# Patient Record
Sex: MALE | Race: WHITE | NOT HISPANIC OR LATINO
[De-identification: names, ages, dates, MRNs, and addresses within clinical notes are randomized per-mention and may not be internally consistent; named-entity substitution may affect disease eponyms.]

---

## 2017-01-27 ENCOUNTER — APPOINTMENT (OUTPATIENT)
Dept: SURGERY | Facility: CLINIC | Age: 82
End: 2017-01-27

## 2017-01-27 VITALS
OXYGEN SATURATION: 96 % | RESPIRATION RATE: 16 BRPM | HEART RATE: 71 BPM | TEMPERATURE: 98.6 F | SYSTOLIC BLOOD PRESSURE: 161 MMHG | DIASTOLIC BLOOD PRESSURE: 68 MMHG

## 2017-01-27 VITALS — HEIGHT: 70.5 IN | WEIGHT: 180 LBS | BODY MASS INDEX: 25.48 KG/M2

## 2017-01-27 DIAGNOSIS — Z80.3 FAMILY HISTORY OF MALIGNANT NEOPLASM OF BREAST: ICD-10-CM

## 2017-01-27 DIAGNOSIS — K62.89 OTHER SPECIFIED DISEASES OF ANUS AND RECTUM: ICD-10-CM

## 2017-01-27 DIAGNOSIS — Z86.79 PERSONAL HISTORY OF OTHER DISEASES OF THE CIRCULATORY SYSTEM: ICD-10-CM

## 2017-01-27 DIAGNOSIS — Z86.73 PERSONAL HISTORY OF TRANSIENT ISCHEMIC ATTACK (TIA), AND CEREBRAL INFARCTION W/OUT RESIDUAL DEFICITS: ICD-10-CM

## 2017-01-27 DIAGNOSIS — Z78.9 OTHER SPECIFIED HEALTH STATUS: ICD-10-CM

## 2017-01-27 DIAGNOSIS — K59.00 CONSTIPATION, UNSPECIFIED: ICD-10-CM

## 2017-01-27 DIAGNOSIS — Z86.2 PERSONAL HISTORY OF DISEASES OF THE BLOOD AND BLOOD-FORMING ORGANS AND CERTAIN DISORDERS INVOLVING THE IMMUNE MECHANISM: ICD-10-CM

## 2017-01-27 RX ORDER — LORATADINE 5 MG
TABLET,CHEWABLE ORAL
Refills: 0 | Status: ACTIVE | COMMUNITY

## 2017-01-27 RX ORDER — SIMVASTATIN 20 MG/1
20 TABLET, FILM COATED ORAL
Refills: 0 | Status: ACTIVE | COMMUNITY

## 2017-01-27 RX ORDER — ASPIRIN 325 MG/1
325 TABLET, FILM COATED ORAL
Refills: 0 | Status: ACTIVE | COMMUNITY

## 2017-02-03 ENCOUNTER — CHART COPY (OUTPATIENT)
Age: 82
End: 2017-02-03

## 2017-02-03 ENCOUNTER — APPOINTMENT (OUTPATIENT)
Dept: SURGERY | Facility: CLINIC | Age: 82
End: 2017-02-03

## 2017-02-03 VITALS
SYSTOLIC BLOOD PRESSURE: 150 MMHG | HEART RATE: 64 BPM | DIASTOLIC BLOOD PRESSURE: 76 MMHG | RESPIRATION RATE: 16 BRPM | TEMPERATURE: 98 F | OXYGEN SATURATION: 97 %

## 2017-03-21 ENCOUNTER — APPOINTMENT (OUTPATIENT)
Dept: SURGERY | Facility: CLINIC | Age: 82
End: 2017-03-21

## 2017-03-21 VITALS
OXYGEN SATURATION: 98 % | TEMPERATURE: 98.4 F | RESPIRATION RATE: 16 BRPM | HEART RATE: 67 BPM | SYSTOLIC BLOOD PRESSURE: 145 MMHG | DIASTOLIC BLOOD PRESSURE: 67 MMHG

## 2017-03-21 DIAGNOSIS — K64.8 OTHER HEMORRHOIDS: ICD-10-CM

## 2018-07-23 PROBLEM — Z86.73 HISTORY OF STROKE: Status: RESOLVED | Noted: 2017-01-27 | Resolved: 2018-07-23

## 2021-01-20 ENCOUNTER — INPATIENT (INPATIENT)
Facility: HOSPITAL | Age: 86
LOS: 4 days | Discharge: HOME | DRG: 641 | End: 2021-01-25
Attending: INTERNAL MEDICINE | Admitting: INTERNAL MEDICINE
Payer: MEDICARE

## 2021-01-20 VITALS
OXYGEN SATURATION: 97 % | WEIGHT: 136.03 LBS | HEART RATE: 66 BPM | HEIGHT: 70 IN | SYSTOLIC BLOOD PRESSURE: 158 MMHG | DIASTOLIC BLOOD PRESSURE: 57 MMHG | RESPIRATION RATE: 16 BRPM | TEMPERATURE: 97 F

## 2021-01-20 DIAGNOSIS — C26.9 MALIGNANT NEOPLASM OF ILL-DEFINED SITES WITHIN THE DIGESTIVE SYSTEM: ICD-10-CM

## 2021-01-20 DIAGNOSIS — E83.52 HYPERCALCEMIA: ICD-10-CM

## 2021-01-20 DIAGNOSIS — R26.2 DIFFICULTY IN WALKING, NOT ELSEWHERE CLASSIFIED: ICD-10-CM
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
## 2021-01-20 DIAGNOSIS — N17.9 ACUTE KIDNEY FAILURE, UNSPECIFIED: ICD-10-CM

## 2021-01-20 LAB
ALBUMIN SERPL ELPH-MCNC: 3.1 G/DL — LOW (ref 3.3–5)
ALP SERPL-CCNC: 85 U/L — SIGNIFICANT CHANGE UP (ref 30–120)
ALT FLD-CCNC: 21 U/L DA — SIGNIFICANT CHANGE UP (ref 10–60)
ANION GAP SERPL CALC-SCNC: 4 MMOL/L — LOW (ref 5–17)
APPEARANCE UR: CLEAR — SIGNIFICANT CHANGE UP
APTT BLD: 28.5 SEC — SIGNIFICANT CHANGE UP (ref 27.5–35.5)
AST SERPL-CCNC: 24 U/L — SIGNIFICANT CHANGE UP (ref 10–40)
BACTERIA # UR AUTO: ABNORMAL
BASOPHILS # BLD AUTO: 0.08 K/UL — SIGNIFICANT CHANGE UP (ref 0–0.2)
BASOPHILS NFR BLD AUTO: 0.5 % — SIGNIFICANT CHANGE UP (ref 0–2)
BILIRUB SERPL-MCNC: 0.4 MG/DL — SIGNIFICANT CHANGE UP (ref 0.2–1.2)
BILIRUB UR-MCNC: NEGATIVE — SIGNIFICANT CHANGE UP
BUN SERPL-MCNC: 36 MG/DL — HIGH (ref 7–23)
CALCIUM SERPL-MCNC: 13 MG/DL — CRITICAL HIGH (ref 8.4–10.5)
CHLORIDE SERPL-SCNC: 105 MMOL/L — SIGNIFICANT CHANGE UP (ref 96–108)
CO2 SERPL-SCNC: 29 MMOL/L — SIGNIFICANT CHANGE UP (ref 22–31)
COLOR SPEC: YELLOW — SIGNIFICANT CHANGE UP
CREAT SERPL-MCNC: 1.78 MG/DL — HIGH (ref 0.5–1.3)
DIFF PNL FLD: ABNORMAL
EOSINOPHIL # BLD AUTO: 0.55 K/UL — HIGH (ref 0–0.5)
EOSINOPHIL NFR BLD AUTO: 3.7 % — SIGNIFICANT CHANGE UP (ref 0–6)
EPI CELLS # UR: SIGNIFICANT CHANGE UP
GLUCOSE BLDC GLUCOMTR-MCNC: 87 MG/DL — SIGNIFICANT CHANGE UP (ref 70–99)
GLUCOSE SERPL-MCNC: 110 MG/DL — HIGH (ref 70–99)
GLUCOSE UR QL: NEGATIVE MG/DL — SIGNIFICANT CHANGE UP
HCT VFR BLD CALC: 35.6 % — LOW (ref 39–50)
HGB BLD-MCNC: 11.5 G/DL — LOW (ref 13–17)
IMM GRANULOCYTES NFR BLD AUTO: 0.5 % — SIGNIFICANT CHANGE UP (ref 0–1.5)
INR BLD: 1.05 RATIO — SIGNIFICANT CHANGE UP (ref 0.88–1.16)
KETONES UR-MCNC: NEGATIVE — SIGNIFICANT CHANGE UP
LEUKOCYTE ESTERASE UR-ACNC: ABNORMAL
LYMPHOCYTES # BLD AUTO: 23.7 % — SIGNIFICANT CHANGE UP (ref 13–44)
LYMPHOCYTES # BLD AUTO: 3.52 K/UL — HIGH (ref 1–3.3)
MANUAL SMEAR VERIFICATION: SIGNIFICANT CHANGE UP
MCHC RBC-ENTMCNC: 29.4 PG — SIGNIFICANT CHANGE UP (ref 27–34)
MCHC RBC-ENTMCNC: 32.3 GM/DL — SIGNIFICANT CHANGE UP (ref 32–36)
MCV RBC AUTO: 91 FL — SIGNIFICANT CHANGE UP (ref 80–100)
MONOCYTES # BLD AUTO: 1.31 K/UL — HIGH (ref 0–0.9)
MONOCYTES NFR BLD AUTO: 8.8 % — SIGNIFICANT CHANGE UP (ref 2–14)
NEUTROPHILS # BLD AUTO: 9.33 K/UL — HIGH (ref 1.8–7.4)
NEUTROPHILS NFR BLD AUTO: 62.8 % — SIGNIFICANT CHANGE UP (ref 43–77)
NITRITE UR-MCNC: NEGATIVE — SIGNIFICANT CHANGE UP
NRBC # BLD: 0 /100 WBCS — SIGNIFICANT CHANGE UP (ref 0–0)
PH UR: 5 — SIGNIFICANT CHANGE UP (ref 5–8)
PLAT MORPH BLD: NORMAL — SIGNIFICANT CHANGE UP
PLATELET # BLD AUTO: 40 K/UL — LOW (ref 150–400)
POTASSIUM SERPL-MCNC: 3.8 MMOL/L — SIGNIFICANT CHANGE UP (ref 3.5–5.3)
POTASSIUM SERPL-SCNC: 3.8 MMOL/L — SIGNIFICANT CHANGE UP (ref 3.5–5.3)
PROT SERPL-MCNC: 7.6 G/DL — SIGNIFICANT CHANGE UP (ref 6–8.3)
PROT UR-MCNC: 30 MG/DL
PROTHROM AB SERPL-ACNC: 12.7 SEC — SIGNIFICANT CHANGE UP (ref 10.6–13.6)
RBC # BLD: 3.91 M/UL — LOW (ref 4.2–5.8)
RBC # FLD: 13.8 % — SIGNIFICANT CHANGE UP (ref 10.3–14.5)
RBC BLD AUTO: SIGNIFICANT CHANGE UP
RBC CASTS # UR COMP ASSIST: ABNORMAL /HPF (ref 0–4)
SARS-COV-2 RNA SPEC QL NAA+PROBE: SIGNIFICANT CHANGE UP
SODIUM SERPL-SCNC: 138 MMOL/L — SIGNIFICANT CHANGE UP (ref 135–145)
SP GR SPEC: 1.02 — SIGNIFICANT CHANGE UP (ref 1.01–1.02)
TROPONIN I SERPL-MCNC: 0.01 NG/ML — LOW (ref 0.02–0.06)
UROBILINOGEN FLD QL: NEGATIVE MG/DL — SIGNIFICANT CHANGE UP
WBC # BLD: 14.86 K/UL — HIGH (ref 3.8–10.5)
WBC # FLD AUTO: 14.86 K/UL — HIGH (ref 3.8–10.5)
WBC UR QL: SIGNIFICANT CHANGE UP

## 2021-01-20 PROCEDURE — 99285 EMERGENCY DEPT VISIT HI MDM: CPT

## 2021-01-20 PROCEDURE — 72125 CT NECK SPINE W/O DYE: CPT | Mod: 26,MH

## 2021-01-20 PROCEDURE — 73080 X-RAY EXAM OF ELBOW: CPT | Mod: 26,LT

## 2021-01-20 PROCEDURE — 71045 X-RAY EXAM CHEST 1 VIEW: CPT | Mod: 26

## 2021-01-20 PROCEDURE — 72110 X-RAY EXAM L-2 SPINE 4/>VWS: CPT | Mod: 26

## 2021-01-20 PROCEDURE — 70450 CT HEAD/BRAIN W/O DYE: CPT | Mod: 26,MH

## 2021-01-20 RX ORDER — SODIUM CHLORIDE 9 MG/ML
1000 INJECTION INTRAMUSCULAR; INTRAVENOUS; SUBCUTANEOUS
Refills: 0 | Status: DISCONTINUED | OUTPATIENT
Start: 2021-01-20 | End: 2021-01-24

## 2021-01-20 RX ORDER — TAMSULOSIN HYDROCHLORIDE 0.4 MG/1
0.4 CAPSULE ORAL AT BEDTIME
Refills: 0 | Status: DISCONTINUED | OUTPATIENT
Start: 2021-01-20 | End: 2021-01-25

## 2021-01-20 RX ORDER — SODIUM CHLORIDE 9 MG/ML
500 INJECTION INTRAMUSCULAR; INTRAVENOUS; SUBCUTANEOUS ONCE
Refills: 0 | Status: COMPLETED | OUTPATIENT
Start: 2021-01-20 | End: 2021-01-20

## 2021-01-20 RX ORDER — AMLODIPINE BESYLATE 2.5 MG/1
5 TABLET ORAL DAILY
Refills: 0 | Status: DISCONTINUED | OUTPATIENT
Start: 2021-01-20 | End: 2021-01-21

## 2021-01-20 RX ADMIN — SODIUM CHLORIDE 100 MILLILITER(S): 9 INJECTION INTRAMUSCULAR; INTRAVENOUS; SUBCUTANEOUS at 16:52

## 2021-01-20 RX ADMIN — AMLODIPINE BESYLATE 5 MILLIGRAM(S): 2.5 TABLET ORAL at 16:52

## 2021-01-20 RX ADMIN — SODIUM CHLORIDE 500 MILLILITER(S): 9 INJECTION INTRAMUSCULAR; INTRAVENOUS; SUBCUTANEOUS at 14:25

## 2021-01-20 RX ADMIN — SODIUM CHLORIDE 500 MILLILITER(S): 9 INJECTION INTRAMUSCULAR; INTRAVENOUS; SUBCUTANEOUS at 15:20

## 2021-01-20 RX ADMIN — TAMSULOSIN HYDROCHLORIDE 0.4 MILLIGRAM(S): 0.4 CAPSULE ORAL at 20:42

## 2021-01-20 NOTE — CONSULT NOTE ADULT - ASSESSMENT
JOSS: Prerenal azotemia  Hypercalcemia: ? Of malignancy  Hypertension  s/p Fall    Will try to get out pt med list from pt's wife. Continue IV hydration with saline. Check Vit D, Phos, iPTH.   Add amlodipine. Monitor BP trend. Titrate BP meds as needed. Salt restriction. Fall precautions.   Avoid nephrotoxic meds as possible. Will follow electrolytes and renal function trend.   Further recommendations pending clinical course. Thank you for the courtesy of this referral.

## 2021-01-20 NOTE — CONSULT NOTE ADULT - ASSESSMENT
The patient is a 89 year old male with a history of gastric cancer s/p chemotherapy who presents with fall.    Plan:  - Episode more consistent with a mechanical fall rather than syncope  - ECG with LBBB; no prior for comparison  - CT head negative for acute pathology  - Cardiac enzymes negative  - If patient is admitted, check echo given LBBB; otherwise this can be done as outpatient The patient is a 89 year old male with a history of gastric cancer s/p chemotherapy who presents with fall.    Plan:  - Episode more consistent with a mechanical fall rather than syncope  - ECG with LBBB; no prior for comparison  - CT head negative for acute pathology  - Cardiac enzymes negative  - JOSS and elevated Ca noted - continue IV fluids  - If patient is admitted, check echo given LBBB; otherwise this can be done as outpatient

## 2021-01-20 NOTE — ED PROVIDER NOTE - CARE PLAN
Principal Discharge DX:	Fall  Secondary Diagnosis:	Ambulatory dysfunction  Secondary Diagnosis:	JOSS (acute kidney injury)  Secondary Diagnosis:	Hypercalcemia

## 2021-01-20 NOTE — ED ADULT TRIAGE NOTE - CHIEF COMPLAINT QUOTE
" I slipped in the bathroom this morning and landed on my back. My whole back hurts and my left arm too. " Pt denies head injury No LOC

## 2021-01-20 NOTE — ED ADULT NURSE NOTE - NSIMPLEMENTINTERV_GEN_ALL_ED
Implemented All Fall with Harm Risk Interventions:  [place] to call system. Call bell, personal items and telephone within reach. Instruct patient to call for assistance. Room bathroom lighting operational. Non-slip footwear when patient is off stretcher. Physically safe environment: no spills, clutter or unnecessary equipment. Stretcher in lowest position, wheels locked, appropriate side rails in place. Provide visual cue, wrist band, yellow gown, etc. Monitor gait and stability. Monitor for mental status changes and reorient to person, place, and time. Review medications for side effects contributing to fall risk. Reinforce activity limits and safety measures with patient and family. Provide visual clues: red socks.

## 2021-01-20 NOTE — PHYSICAL THERAPY INITIAL EVALUATION ADULT - GAIT DEVIATIONS NOTED, PT EVAL
decreased lidia/increased time in double stance/decreased velocity of limb motion/decreased step length/decreased stride length/decreased swing-to-stance ratio/decreased weight-shifting ability

## 2021-01-20 NOTE — ED PROVIDER NOTE - ENMT, MLM
Airway patent, Nasal mucosa clear. Mouth with normal mucosa.dry lips Throat has no vesicles, no oropharyngeal exudates and uvula is midline.

## 2021-01-20 NOTE — H&P ADULT - PROBLEM SELECTOR PLAN 2
IVF  Avoid nephrotoxic medications  Monitor BMP  Renal consult  Further work-up/management pending clinical course.

## 2021-01-20 NOTE — ED PROVIDER NOTE - PROGRESS NOTE DETAILS
dr wilson cardio eval pt in ed. advised if pt admitted in pt echo if pt dc out pt echo. will re-eval labs and imaging reviewed. will admit to medicine dr grove will kindly accept pt.

## 2021-01-20 NOTE — ED PROVIDER NOTE - OBJECTIVE STATEMENT
pt is a 89yo male with pmhx of stomach cancer presents s/p fall last pm. pt reports he is unsure why he fell last pm and unsure if he had loc. pt reports neck pain and right elbow pain. pt did not take anything for symptoms. pt has been falling more frequently recently, uses a walker at baseline. pt denies fever, cp, sob, n/v/, ac use .   pmd: mi

## 2021-01-20 NOTE — ED PROVIDER NOTE - CLINICAL SUMMARY MEDICAL DECISION MAKING FREE TEXT BOX
pt is a 89yo male with pmhx of stomach cancer presents s/p fall last pm. pt reports he is unsure why he fell last pm and unsure if he had loc. pt reports neck pain and left elbow pain. pt did not take anything for symptoms. pt has been falling more frequently recently, uses a walker at baseline. will do labs, ua to r/o uti, ct head to r/o ich, ct c-spine to r/o fx, xrays, consult cardio, consult pt.

## 2021-01-20 NOTE — ED ADULT NURSE NOTE - OBJECTIVE STATEMENT
pt states he fell in the bathroom last night unsure if he hit his head or not. pt states hes been falling a lot this year, uses a walker at home lives at home with his wife. has abrasion to L elbow, and is c/o neck pain and back pain. pt has right chest wall port for GI cancer last treatment was 2 weeks ago. no other sins of trauma or deformity noted, no obvious head injury, abd soft nontender.

## 2021-01-20 NOTE — PHYSICAL THERAPY INITIAL EVALUATION ADULT - ADDITIONAL COMMENTS
Pt lives in private home with 2 MARCO A + HR and 1 flight in home to bedroom + HR. Pt owns a RW and SAC. Pt's wife (Zulay) states he has fallen twice in the past month.

## 2021-01-20 NOTE — PHYSICAL THERAPY INITIAL EVALUATION ADULT - PERTINENT HX OF CURRENT PROBLEM, REHAB EVAL
The patient is a 89 year old male with a history of gastric cancer s/p chemotherapy who presents with fall. He states he was in his bathroom where he stepped wrong and fell. He denies passing out or lightheadedness. No chest pain, shortness of breath, or palpitations. CT scan of head and C spine negative. The patient is a 89 year old male with a history of gastric cancer s/p chemotherapy who presents with fall. He states he was in his bathroom where he stepped wrong and fell. He denies passing out or lightheadedness. No chest pain, shortness of breath, or palpitations. CT scan of head and C spine negative. x-ray left elbow negative.

## 2021-01-20 NOTE — PHYSICAL THERAPY INITIAL EVALUATION ADULT - IMPAIRMENTS CONTRIBUTING TO GAIT DEVIATIONS, PT EVAL
impaired balance/impaired coordination/decreased flexibility/narrow base of support/decreased strength

## 2021-01-20 NOTE — ED PROVIDER NOTE - ATTENDING CONTRIBUTION TO CARE
89 yo M p/w 2 falls over past several days. Pt with some back pain from fall today - diff ambulating . No cp/sob/palp. no known fever/chills. no recent illness. no agg/allev factors. No other inj or co.  exam: MM MOist. neck supple. L elbow abrasion, No ext signs of head trauma. Mild paraspinal lumbar tend. no signs of truncal trauma. nl non-focal neuro exam. no other acute findings.  No known covid exposure / prior infxn  Check labs, xr, CT  Pt with elev Ca, renal insuff - IVF, TBA

## 2021-01-20 NOTE — ED PROVIDER NOTE - PHYSICAL EXAMINATION
SPINE: c-spine: supple, no spinal tenderness. no midline tenderness. no paraspinal tenderness. no body step off. no deformity. no muscle spasm. FROM   Thoracic spine: no spinal tenderness. no midline tenderness. no paraspinal tenderness. no body step off. no deformity. no muscle spasm.FROM   LS-spine:+spinal tenderness. +midline tenderness. no paraspinal tenderness. no body step off. no deformity.no muscle spasm. FROM from x 4. SENSATION GROSSLY INTACT X4.  sm r ue+ r shoulder nontender + r elbow minimal tenderness. rest of extremity nontender from sensation intact + 2 radial

## 2021-01-20 NOTE — H&P ADULT - HISTORY OF PRESENT ILLNESS
This is a 89 M with PMH of HLD BPH gastric cancer diagnosed 3/2020 who came in c/o multiple falls. Patient has been mostly bed ridden recently as per wife. Patient walks with cane. He got up to go to bathroom yesterday and he fell. Denies dizziness, lightheadedness, palpitations. Patient has had about 4 falls in the past couple of months. He had immunotherapy several months ago (3 rounds). He was to start chemotherapy this week. Wife states that his blood was "normal" about a month ago. + weight loss over the past several months. Denies CP, fevers, chills, abdominal, n/v/d.

## 2021-01-21 ENCOUNTER — TRANSCRIPTION ENCOUNTER (OUTPATIENT)
Age: 86
End: 2021-01-21

## 2021-01-21 DIAGNOSIS — D69.6 THROMBOCYTOPENIA, UNSPECIFIED: ICD-10-CM

## 2021-01-21 DIAGNOSIS — J90 PLEURAL EFFUSION, NOT ELSEWHERE CLASSIFIED: ICD-10-CM

## 2021-01-21 LAB
% ALBUMIN: 47.2 % — SIGNIFICANT CHANGE UP
% ALPHA 1: 4.1 % — SIGNIFICANT CHANGE UP
% ALPHA 2: 10.2 % — SIGNIFICANT CHANGE UP
% BETA: 9.8 % — SIGNIFICANT CHANGE UP
% GAMMA: 28.7 % — SIGNIFICANT CHANGE UP
% M SPIKE: 7.7 % — SIGNIFICANT CHANGE UP
24R-OH-CALCIDIOL SERPL-MCNC: 17.4 NG/ML — LOW (ref 30–80)
ALBUMIN SERPL ELPH-MCNC: 2.8 G/DL — LOW (ref 3.3–5)
ALBUMIN SERPL ELPH-MCNC: 3.2 G/DL — LOW (ref 3.6–5.5)
ALBUMIN/GLOB SERPL ELPH: 0.9 RATIO — SIGNIFICANT CHANGE UP
ALP SERPL-CCNC: 86 U/L — SIGNIFICANT CHANGE UP (ref 30–120)
ALPHA1 GLOB SERPL ELPH-MCNC: 0.3 G/DL — SIGNIFICANT CHANGE UP (ref 0.1–0.4)
ALPHA2 GLOB SERPL ELPH-MCNC: 0.7 G/DL — SIGNIFICANT CHANGE UP (ref 0.5–1)
ALT FLD-CCNC: 16 U/L DA — SIGNIFICANT CHANGE UP (ref 10–60)
ANION GAP SERPL CALC-SCNC: 9 MMOL/L — SIGNIFICANT CHANGE UP (ref 5–17)
AST SERPL-CCNC: 27 U/L — SIGNIFICANT CHANGE UP (ref 10–40)
B-GLOBULIN SERPL ELPH-MCNC: 0.7 G/DL — SIGNIFICANT CHANGE UP (ref 0.5–1)
BILIRUB SERPL-MCNC: 0.5 MG/DL — SIGNIFICANT CHANGE UP (ref 0.2–1.2)
BUN SERPL-MCNC: 34 MG/DL — HIGH (ref 7–23)
CALCIUM SERPL-MCNC: 12.2 MG/DL — HIGH (ref 8.4–10.5)
CHLORIDE SERPL-SCNC: 108 MMOL/L — SIGNIFICANT CHANGE UP (ref 96–108)
CO2 SERPL-SCNC: 25 MMOL/L — SIGNIFICANT CHANGE UP (ref 22–31)
CREAT SERPL-MCNC: 1.51 MG/DL — HIGH (ref 0.5–1.3)
CULTURE RESULTS: SIGNIFICANT CHANGE UP
GAMMA GLOBULIN: 2 G/DL — HIGH (ref 0.6–1.6)
GLUCOSE SERPL-MCNC: 80 MG/DL — SIGNIFICANT CHANGE UP (ref 70–99)
HCT VFR BLD CALC: 35.6 % — LOW (ref 39–50)
HGB BLD-MCNC: 11.6 G/DL — LOW (ref 13–17)
INTERPRETATION SERPL IFE-IMP: SIGNIFICANT CHANGE UP
LDH SERPL L TO P-CCNC: 484 U/L — HIGH (ref 50–242)
M-SPIKE: 0.5 G/DL — HIGH (ref 0–0)
MANUAL SMEAR VERIFICATION: SIGNIFICANT CHANGE UP
MCHC RBC-ENTMCNC: 29.5 PG — SIGNIFICANT CHANGE UP (ref 27–34)
MCHC RBC-ENTMCNC: 32.6 GM/DL — SIGNIFICANT CHANGE UP (ref 32–36)
MCV RBC AUTO: 90.6 FL — SIGNIFICANT CHANGE UP (ref 80–100)
NRBC # BLD: 0 /100 WBCS — SIGNIFICANT CHANGE UP (ref 0–0)
PHOSPHATE SERPL-MCNC: 3.1 MG/DL — SIGNIFICANT CHANGE UP (ref 2.5–4.5)
PLAT MORPH BLD: ABNORMAL
PLATELET # BLD AUTO: 32 K/UL — LOW (ref 150–400)
POTASSIUM SERPL-MCNC: 3.8 MMOL/L — SIGNIFICANT CHANGE UP (ref 3.5–5.3)
POTASSIUM SERPL-SCNC: 3.8 MMOL/L — SIGNIFICANT CHANGE UP (ref 3.5–5.3)
PROT PATTERN SERPL ELPH-IMP: SIGNIFICANT CHANGE UP
PROT SERPL-MCNC: 6.8 G/DL — SIGNIFICANT CHANGE UP (ref 6–8.3)
PROT SERPL-MCNC: 6.8 G/DL — SIGNIFICANT CHANGE UP (ref 6–8.3)
PTH-INTACT FLD-MCNC: 6 PG/ML — LOW (ref 15–65)
RBC # BLD: 3.93 M/UL — LOW (ref 4.2–5.8)
RBC # FLD: 13.6 % — SIGNIFICANT CHANGE UP (ref 10.3–14.5)
RBC BLD AUTO: ABNORMAL
SARS-COV-2 IGG SERPL QL IA: NEGATIVE — SIGNIFICANT CHANGE UP
SARS-COV-2 IGM SERPL IA-ACNC: <3.8 AU/ML — SIGNIFICANT CHANGE UP
SODIUM SERPL-SCNC: 142 MMOL/L — SIGNIFICANT CHANGE UP (ref 135–145)
SPECIMEN SOURCE: SIGNIFICANT CHANGE UP
URATE SERPL-MCNC: 9.3 MG/DL — HIGH (ref 3.4–8.8)
VIT D25+D1,25 OH+D1,25 PNL SERPL-MCNC: 51 PG/ML — SIGNIFICANT CHANGE UP (ref 19.9–79.3)
WBC # BLD: 14.07 K/UL — HIGH (ref 3.8–10.5)
WBC # FLD AUTO: 14.07 K/UL — HIGH (ref 3.8–10.5)

## 2021-01-21 PROCEDURE — 93306 TTE W/DOPPLER COMPLETE: CPT | Mod: 26

## 2021-01-21 PROCEDURE — 71250 CT THORAX DX C-: CPT | Mod: 26

## 2021-01-21 PROCEDURE — 74176 CT ABD & PELVIS W/O CONTRAST: CPT | Mod: 26

## 2021-01-21 RX ORDER — IOHEXOL 300 MG/ML
30 INJECTION, SOLUTION INTRAVENOUS ONCE
Refills: 0 | Status: COMPLETED | OUTPATIENT
Start: 2021-01-21 | End: 2021-01-21

## 2021-01-21 RX ORDER — PAMIDRONATE DISODIUM 9 MG/ML
60 INJECTION, SOLUTION INTRAVENOUS ONCE
Refills: 0 | Status: COMPLETED | OUTPATIENT
Start: 2021-01-21 | End: 2021-01-21

## 2021-01-21 RX ORDER — FUROSEMIDE 40 MG
20 TABLET ORAL ONCE
Refills: 0 | Status: COMPLETED | OUTPATIENT
Start: 2021-01-21 | End: 2021-01-21

## 2021-01-21 RX ADMIN — Medication 20 MILLIGRAM(S): at 13:47

## 2021-01-21 RX ADMIN — PAMIDRONATE DISODIUM 65 MILLIGRAM(S): 9 INJECTION, SOLUTION INTRAVENOUS at 12:41

## 2021-01-21 RX ADMIN — TAMSULOSIN HYDROCHLORIDE 0.4 MILLIGRAM(S): 0.4 CAPSULE ORAL at 21:35

## 2021-01-21 RX ADMIN — AMLODIPINE BESYLATE 5 MILLIGRAM(S): 2.5 TABLET ORAL at 09:33

## 2021-01-21 RX ADMIN — IOHEXOL 30 MILLILITER(S): 300 INJECTION, SOLUTION INTRAVENOUS at 08:32

## 2021-01-21 NOTE — DISCHARGE NOTE PROVIDER - CARE PROVIDER_API CALL
Travis Clark J  NEUROLOGY  [address] [place] [address]  Phone: [phone]  Fax: [phone]  Follow Up Time:    [facility],   Phone: [phone]  Fax: (   )    -  Follow Up Time:

## 2021-01-21 NOTE — DISCHARGE NOTE PROVIDER - PROVIDER TOKENS
PROVIDER:[TOKEN:[[id number]]] FREE:[LAST:[[facility]],PHONE:[phone]],FAX:[(   )    -]]

## 2021-01-21 NOTE — DISCHARGE NOTE PROVIDER - NSDCMRMEDTOKEN_GEN_ALL_CORE_FT
Flomax 0.4 mg oral capsule: 1 cap(s) orally once a day (at bedtime)   amLODIPine 2.5 mg oral tablet: 1 tab(s) orally once a day

## 2021-01-21 NOTE — CONSULT NOTE ADULT - PROBLEM SELECTOR RECOMMENDATION 9
89 M with PMH of HLD BPH gastric cancer diagnosed 3/2020 who came in c/o multiple falls. Patient has been mostly bed ridden recently as per wife. Patient walks with cane  mets malignancy  pleural eff - suspicion for malignant pleural eff -   ct scan reviewed  DNR DNI as per WIFE  supportive medical regimen  plan for Palliative Thoracentesis - left eff - with IR -   monitor for needs, assist with ADL - fall prec - nutrition - assess for pain, distress  on Flomax for BPH  poss Home Hospice dispo -

## 2021-01-21 NOTE — PROGRESS NOTE ADULT - ASSESSMENT
The patient is a 89 year old male with a history of gastric cancer s/p chemotherapy who presents with fall.    Plan:  - Episode more consistent with a mechanical fall rather than syncope  - ECG with LBBB; no prior for comparison  - CT head negative for acute pathology  - Cardiac enzymes negative  - JOSS and elevated Ca noted - continue IV fluids  - Check echo  - Neurology eval noted

## 2021-01-21 NOTE — DISCHARGE NOTE PROVIDER - HOSPITAL COURSE
This is a 89 M with PMH of HLD BPH gastric cancer diagnosed 3/2020 who came in c/o multiple falls. Patient has been mostly bed ridden recently as per wife. Patient walks with cane. He got up to go to bathroom yesterday and he fell. Denies dizziness, lightheadedness, palpitations. Patient has had about 4 falls in the past couple of months. He had immunotherapy several months ago (3 rounds). He was to start chemotherapy this week. Wife states that his blood was "normal" about a month ago. + weight loss over the past several months. Denies CP, fevers, chills, abdominal, n/v/d.    Patient admitted with hypercalcemia due to malignancy  Treated with iv palmidronate and calcitonin with improvement  CT showed advanced metastatic disease  Family opted for home hospice    >35 minutes spent on discharge

## 2021-01-21 NOTE — PROGRESS NOTE ADULT - ASSESSMENT
JOSS: Prerenal azotemia  Hypercalcemia, h/o Sarcoidosis  Hypertension  s/p Fall    Improving calcium levels. Loop diuretic x 1 dose. Will need rheumatology follow up as out pt. May need low dose steroids. Pending Vit D, Phos, iPTH.   Monitor BP trend. Titrate BP meds as needed. Salt restriction. Fall precautions. Avoid nephrotoxic meds as possible. Will follow electrolytes and renal function trend.

## 2021-01-21 NOTE — CONSULT NOTE ADULT - ASSESSMENT
89 yo male with history of abdominal non-hodgkin's lymphoma diagnosed in 3/2020 and treated by Dr. Debbie Barrow ([phone]) with Rituxan single agent with only partial response; planned for Rituxan-mini-CHOP combination chemotherapy based on Dr. Barrow' note in 11/2020; finally had infusaport placed in 12/2020 and pre-chemo Echo in 12/2020; presented to the ER with recurrent falls and confusion    - found to have hypercalcemia which I suspect is due to underlying progressive non-hodgkin's lymphoma  - continue hydration  - will add pamidronate 60mg renal dose  - will check uric acid and LDH  - left message for Dr. Barrow to discuss care as I believe patient's performance status and comorbidities will limit his ability to pursue further treatment; await call back with her prior to making referrals for palliative care vs. hospice services  - case discussed with Dr. STEVEN Sun (hospitalist)

## 2021-01-21 NOTE — DISCHARGE NOTE PROVIDER - NSDCCPCAREPLAN_GEN_ALL_CORE_FT
PRINCIPAL DISCHARGE DIAGNOSIS  Diagnosis: Non Hodgkin's lymphoma  Assessment and Plan of Treatment: Follow-up with hospice care      SECONDARY DISCHARGE DIAGNOSES  Diagnosis: Ambulatory dysfunction  Assessment and Plan of Treatment:      PRINCIPAL DISCHARGE DIAGNOSIS  Diagnosis: Non Hodgkin's lymphoma  Assessment and Plan of Treatment: Follow-up with hospice care      SECONDARY DISCHARGE DIAGNOSES  Diagnosis: Essential hypertension  Assessment and Plan of Treatment: Continue current medications      Diagnosis: Ambulatory dysfunction  Assessment and Plan of Treatment:

## 2021-01-21 NOTE — CONSULT NOTE ADULT - SUBJECTIVE AND OBJECTIVE BOX
For history of frequent falls, suspect most likely this is multifactorial.  suspect  form age related changes  possible peripheral neuropathy ,spinal disc disease s/p diarrhea.    monitor electrolytes  Doubt that this is a new cerebrovascular   spoke to spouse  pt eval  neurologic wise stable 
Date/Time Patient Seen:  		  Referring MD:   Data Reviewed	       Patient is a 89y old  Male who presents with a chief complaint of multiple falls (21 Jan 2021 15:43)      Subjective/HPI  in bed  seen and examined  vs and meds reviewed  labs reviewed  h and p reviewed  er provider note reviewed  neuro eval noted  ct scan reviewed    ex smoker  non drinker  family hx - ashd   ros - weakness - falls       History of Present Illness:  Reason for Admission: multiple falls  History of Present Illness:   This is a 89 M with PMH of HLD BPH gastric cancer diagnosed 3/2020 who came in c/o multiple falls. Patient has been mostly bed ridden recently as per wife. Patient walks with cane. He got up to go to bathroom yesterday and he fell. Denies dizziness, lightheadedness, palpitations. Patient has had about 4 falls in the past couple of months. He had immunotherapy several months ago (3 rounds). He was to start chemotherapy this week. Wife states that his blood was "normal" about a month ago. + weight loss over the past several months. Denies CP, fevers, chills, abdominal, n/v/d.     Allergies and Intolerances:        Allergies:  	No Known Allergies:     Home Medications:   * Patient Currently Takes Medications as of 20-Jan-2021 17:15 documented in Structured Notes  · 	Flomax 0.4 mg oral capsule: Last Dose Taken:  , 1 cap(s) orally once a day (at bedtime)    Patient History:    Past Medical, Past Surgical, and Family History:  PAST MEDICAL HISTORY:  GI cancer.     PAST SURGICAL HISTORY:  No significant past surgical history.     Tobacco Screening:  · Core Measure Site	No    Risk Assessment:    Present on Admission:  Deep Venous Thrombosis	no  Pulmonary Embolus	no     Heart Failure:  Does this patient have a history of or has been diagnosed with heart failure? no.  PAST MEDICAL & SURGICAL HISTORY:  JOSS (acute kidney injury)    Hypercalcemia    Gastric lymphoma    Sarcoidosis    GI cancer    No significant past surgical history          Medication list         MEDICATIONS  (STANDING):  sodium chloride 0.9%. 1000 milliLiter(s) (100 mL/Hr) IV Continuous <Continuous>  tamsulosin 0.4 milliGRAM(s) Oral at bedtime    MEDICATIONS  (PRN):         Vitals log        ICU Vital Signs Last 24 Hrs  T(C): 36.4 (21 Jan 2021 13:40), Max: 37.1 (21 Jan 2021 10:26)  T(F): 97.6 (21 Jan 2021 13:40), Max: 98.8 (21 Jan 2021 10:26)  HR: 82 (21 Jan 2021 13:40) (58 - 82)  BP: 95/52 (21 Jan 2021 13:40) (95/52 - 178/70)  BP(mean): --  ABP: --  ABP(mean): --  RR: 18 (21 Jan 2021 13:40) (18 - 18)  SpO2: 94% (21 Jan 2021 13:40) (93% - 97%)           Input and Output:  I&O's Detail    20 Jan 2021 07:01  -  21 Jan 2021 07:00  --------------------------------------------------------  IN:    sodium chloride 0.9%: 800 mL  Total IN: 800 mL    OUT:  Total OUT: 0 mL    Total NET: 800 mL          Lab Data                        11.6   14.07 )-----------( 32       ( 21 Jan 2021 08:03 )             35.6     01-21    142  |  108  |  34<H>  ----------------------------<  80  3.8   |  25  |  1.51<H>    Ca    12.2<H>      21 Jan 2021 08:03  Phos  3.1     01-21    TPro  6.8  /  Alb  2.8<L>  /  TBili  0.5  /  DBili  x   /  AST  27  /  ALT  16  /  AlkPhos  86  01-21      CARDIAC MARKERS ( 20 Jan 2021 13:17 )  .013 ng/mL / x     / x     / x     / x            Review of Systems	  weakness      Objective     Physical Examination    heart s1s2  lung dec BS  abd soft      Pertinent Lab findings & Imaging      Jose:  NO   Adequate UO     I&O's Detail    20 Jan 2021 07:01  -  21 Jan 2021 07:00  --------------------------------------------------------  IN:    sodium chloride 0.9%: 800 mL  Total IN: 800 mL    OUT:  Total OUT: 0 mL    Total NET: 800 mL               Discussed with:     Cultures:	  Culture Results:   <10,000 CFU/mL Normal Urogenital Jennifer (01-20 @ 21:57)        Radiology        EXAM:  CT CHEST                                  PROCEDURE DATE:  01/21/2021          INTERPRETATION:  CLINICAL INFORMATION: Gastric carcinoma, rule out metastases    COMPARISON: None.    PROCEDURE:  CT of the Chest, Abdomen and Pelvis was performed without intravenous contrast.  Intravenous contrast: None.  Oral contrast: Positive contrast was administered.  Sagittal and coronal reformats were performed.    FINDINGS:  Lack of IV contrast limits evaluation of mediastinal hilar structures vascular structures and solid organ parenchyma  CHEST:  LUNGS AND LARGE AIRWAYS: Patent central airways. Bilateral upper lobe and perihilar scarring and bronchiectasis with architectural distortion. Hyperinflated lungs.  PLEURA: Bilateral moderate pleural effusions left larger than right with loculated components on the left. Bibasilar dependent atelectasis/consolidation left worse than right. Correlate clinically for concomitant infection..  VESSELS: Nonaneurysmal thoracic aorta. Prominent main pulmonary artery concerning for pulmonary hypertension.  HEART: Heart size is normal. Substantial coronary artery calcification. Trace pericardial effusion.  MEDIASTINUM AND JANET: Slight bilateral hilar mediastinal lymph nodes reflecting sequelae of remote granulomatous exposure  CHEST WALL AND LOWER NECK: Within normal limits.    ABDOMEN AND PELVIS:  LIVER: Within normal limits.  BILE DUCTS: Normal caliber.  GALLBLADDER: Within normal limits.  SPLEEN: Within normal limits.  PANCREAS: Within normal limits.  ADRENALS: Within normal limits.  KIDNEYS/URETERS: Left renal cyst. Nonobstructive right renal calculus.    BLADDER: Within normal limits.  REPRODUCTIVE ORGANS: Enlarged prostate    BOWEL: No bowel obstruction. Diffuse gastric wall thickening could be neoplastic in this clinical setting. An inflammatory component is possible.. Appendix no evidence of appendicitis  PERITONEUM: Small ascites.  VESSELS: Nonaneurysmal abdominal aorta.  RETROPERITONEUM/LYMPH NODES: Extensive bulky coalescent retroperitoneal para-aortic adenopathy likely metastatic. Bulky soft tissue masses throughout the abdominal mesentery could represent coalescent metastatic adenopathy or carcinomatosis..  ABDOMINAL WALL: Diffuse anasarca.  BONES: Multiple lucent thoracolumbar spinal lesions consistent with metastases. Recommend MR thoracolumbar spine to better evaluate the canal and cord.    IMPRESSION:  Limited by lack of IV contrast.  Bilateral moderate pleural effusions left larger than right. Bibasilar dependent consolidation/atelectasis. Correlate for infection.  Background of chronic lung disease  Diffuse gastric wall thickening could be neoplastic and/or inflammatory.  Extensive bulky coalescent retroperitoneal lymphadenopathy likely metastatic.  Bulky mesenteric soft tissue masses may represent  metastatic adenopathy or carcinomatosis.  Small ascites..  Spinal osseous metastases.  Additional findings as discussed              VEENA JOINER MD; Attending Radiologist  This document has been electronically signed. Jan 21 2021  2:32PM                      
History of Present Illness: The patient is a 89 year old male with a history of gastric cancer s/p chemotherapy who presents with fall. He states he was in his bathroom where he stepped wrong and fell. He denies passing out or lightheadedness. No chest pain, shortness of breath, or palpitations. He is not aware of any ECG abnormalities.    Past Medical/Surgical History:  Gastric cancer s/p chemotherapy    Medications:  Unknown    Family History: Non-contributory family history of premature cardiovascular atherosclerotic disease    Social History: No tobacco, alcohol or drug use    Review of Systems:  General: No fevers, chills, weight loss or gain  Skin: No rashes, color changes  Cardiovascular: No chest pain, orthopnea  Respiratory: No shortness of breath, cough  Gastrointestinal: No nausea, abdominal pain  Genitourinary: No incontinence, pain with urination  Musculoskeletal: No pain, swelling, decreased range of motion  Neurological: No headache, weakness  Psychiatric: No depression, anxiety  Endocrine: No weight loss or gain, increased thirst  All other systems are comprehensively negative.    Physical Exam:  Vitals:        Vital Signs Last 24 Hrs  T(C): 36.2 (20 Jan 2021 12:23), Max: 36.2 (20 Jan 2021 12:23)  T(F): 97.2 (20 Jan 2021 12:23), Max: 97.2 (20 Jan 2021 12:23)  HR: 66 (20 Jan 2021 12:23) (66 - 66)  BP: 158/57 (20 Jan 2021 12:23) (158/57 - 158/57)  BP(mean): --  RR: 16 (20 Jan 2021 12:23) (16 - 16)  SpO2: 97% (20 Jan 2021 12:23) (97% - 97%)  General: NAD  HEENT: MMM  Neck: No JVD, no carotid bruit  Lungs: CTAB  CV: RRR, nl S1/S2, no M/R/G  Abdomen: S/NT/ND, +BS  Extremities: No LE edema, no cyanosis  Neuro: AAOx3, non-focal  Skin: No rash    Labs:                        x      14.86 )-----------( x        ( 20 Jan 2021 13:17 )             x        01-20    138  |  105  |  36<H>  ----------------------------<  110<H>  3.8   |  29  |  1.78<H>    Ca    13.0<HH>      20 Jan 2021 13:17    TPro  7.6  /  Alb  3.1<L>  /  TBili  0.4  /  DBili  x   /  AST  24  /  ALT  21  /  AlkPhos  85  01-20    CARDIAC MARKERS ( 20 Jan 2021 13:17 )  .013 ng/mL / x     / x     / x     / x          PT/INR - ( 20 Jan 2021 13:17 )   PT: 12.7 sec;   INR: 1.05 ratio         PTT - ( 20 Jan 2021 13:17 )  PTT:28.5 sec    ECG: NSR, 1st deg AVB, LBBB    
Patient is a 89y old  Male who presents with a chief complaint of s/p fall    HPI:  pt is a 89yo male with pmhx of stomach cancer presents s/p fall last pm. pt reports he is unsure why he fell last pm and unsure if he had loc. pt reports neck pain and right elbow pain. pt did not take anything for symptoms. pt has been falling more frequently recently, uses a walker at baseline. pt denies fever, cp, sob, n/v/, ac use .   pmd: mi    Renal consult called for JOSS, Hypercalcemia. History obtained from chart and patient.     PAST MEDICAL HISTORY:  GI cancer        PAST SURGICAL HISTORY:  No significant past surgical history        FAMILY HISTORY:      SOCIAL HISTORY: No smoking or alcohol use     Allergies    No Known Allergies    Intolerances      Home Medications:    MEDICATIONS  (STANDING):    MEDICATIONS  (PRN):      REVIEW OF SYSTEMS:  General: no distress  Respiratory: No cough, SOB  Cardiovascular: No CP or Palpitations	  Gastrointestinal: No nausea, Vomiting. No diarrhea  Genitourinary: No urinary complaints	  Musculoskeletal:  No new rash or lesions	  all other systems negative    T(F): 97.2 (21 @ 12:23), Max: 97.2 (21 @ 12:23)  HR: 66 (21 @ 12:23) (66 - 66)  BP: 158/57 (21 @ 12:23) (158/57 - 158/57)  RR: 16 (21 @ 12:23) (16 - 16)  SpO2: 97% (21 @ 12:23) (97% - 97%)  Wt(kg): --    PHYSICAL EXAM:  General: NAD  Respiratory: b/l air entry  Cardiovascular: S1 S2  Gastrointestinal: soft  Extremities: edema            138  |  105  |  36<H>  ----------------------------<  110<H>  3.8   |  29  |  1.78<H>    Ca    13.0<HH>      2021 13:17    TPro  7.6  /  Alb  3.1<L>  /  TBili  0.4  /  DBili  x   /  AST  24  /  ALT  21  /  AlkPhos  85                            11.5   14.86 )-----------( 40       ( 2021 13:17 )             35.6       Calcium, Total Serum: 13.0 mg/dL ( @ 13:17)  Blood Urea Nitrogen, Serum: 36 mg/dL ( @ 13:17)  Potassium, Serum: 3.8 mmol/L ( @ 13:17)  Hemoglobin: 11.5 g/dL ( @ 13:17)      Creatinine, Serum: 1.78 ( @ 13:17)      Urinalysis Basic - ( 2021 14:38 )    Color: Yellow / Appearance: Clear / S.025 / pH: x  Gluc: x / Ketone: Negative  / Bili: Negative / Urobili: Negative mg/dL   Blood: x / Protein: 30 mg/dL / Nitrite: Negative   Leuk Esterase: Trace / RBC: 3-5 /HPF / WBC 3-5   Sq Epi: x / Non Sq Epi: Few / Bacteria: Few      LIVER FUNCTIONS - ( 2021 13:17 )  Alb: 3.1 g/dL / Pro: 7.6 g/dL / ALK PHOS: 85 U/L / ALT: 21 U/L DA / AST: 24 U/L / GGT: x           CARDIAC MARKERS ( 2021 13:17 )  .013 ng/mL / x     / x     / x     / x            < from: Xray Chest 1 View- PORTABLE-Urgent (21 @ 13:09) >    EXAM:  XR CHEST PORTABLE URGENT 1V                                  PROCEDURE DATE:  2021          INTERPRETATION:  Exam Date: 2021 1:09 PM    Chest radiograph (one view)    CLINICAL INFORMATION:  Chest Pain    TECHNIQUE:  Single frontalview of the chest was obtained.    COMPARISON: None    FINDINGS/  IMPRESSION:    Right port catheter with tip in the SVC.    Small left pleural effusion and tiny right pleural effusion. Scattered interstitial markings the bilateral mid and lower lungzones, nonspecific, may reflect fibrotic changes versus other abnormality.        NICOLAS DORMAN MD; Attending Radiologist  This document has been electronically signed. 2021  1:17PM    < end of copied text >            
Patient is a 89y old  Male who presents with a chief complaint of multiple falls (21 Jan 2021 07:52)      HPI:  This is a 89 M with PMH of HLD BPH found to have intestinal mass in 3/2020 with pathology consistent with intestinal non-Hodgkin's Lymphoma. He has been under the care of Dr. Debbie Barrow and was treated with Rituximab single agent in Summer 2020. At last visit with Dr. Barrow in 11/2020 due to only having "some response", was recommended to undergo additional treatment with combination chemotherapy with Rituxan-miniCHOP. over course of 12/2020 underwent placement of infusaport and Echo to determine EF prior to cardiotoxic chemotherapy. He was planned to initiate therapy this week but unfortunately due to recurrent falls and confusion over the course of last month, presented to the ER via ambulance.   In the ER found to have elevated calcium level at 13 (last calcium on 12/2/20 was 10.3 with creatinine 1.7);  also noted to have drop in platelet count to 40K (last platelet count on 12/2/20 was normal at 214); l     ROS:  Negative except for: confusion, intermittent abdominal pain; recurrent falls; diarrhea at home after taking stool softeners; reports he had constipation initially    PAST MEDICAL & SURGICAL HISTORY:  LBBB  Intestinal lymphoma as per HPI  GERD  CHF  CAD  BPH  High cholesteron  Sarcoidosis    SOCIAL HISTORY:  - lives at home  - former tobacco  - denies ETOH    FAMILY HISTORY:  Father with CAD    MEDICATIONS  (STANDING):  amLODIPine   Tablet 5 milliGRAM(s) Oral daily  sodium chloride 0.9%. 1000 milliLiter(s) (100 mL/Hr) IV Continuous <Continuous>  tamsulosin 0.4 milliGRAM(s) Oral at bedtime    MEDICATIONS  (PRN):      Allergies    No Known Allergies      Vital Signs Last 24 Hrs  T(C): 36.6 (21 Jan 2021 00:28), Max: 36.8 (20 Jan 2021 16:55)  T(F): 97.9 (21 Jan 2021 00:28), Max: 98.2 (20 Jan 2021 16:55)  HR: 58 (21 Jan 2021 05:15) (58 - 78)  BP: 146/60 (21 Jan 2021 05:15) (146/60 - 178/70)  BP(mean): --  RR: 18 (21 Jan 2021 00:28) (16 - 18)  SpO2: 96% (21 Jan 2021 00:28) (96% - 98%)    PHYSICAL EXAM  General: adult thin frail elderly male  HEENT: clear oropharynx, anicteric sclera, pink conjunctivae  Neck: supple  CV: normal S1S2 with no murmur rubs or gallops  Lungs: clear to auscultation, no wheezes, no rhales - + infusaport in right chest  Abdomen: soft non-tender non-distended, no hepato/splenomegaly  Ext: no clubbing cyanosis or edema  Skin: no rashes and no petichiae  Neuro: alert but confused; no focal deficits      LABS:    CBC Full  -  ( 21 Jan 2021 08:03 )  WBC Count : 14.07 K/uL  RBC Count : 3.93 M/uL  Hemoglobin : 11.6 g/dL  Hematocrit : 35.6 %  Platelet Count - Automated : 40   Mean Cell Volume : 90.6 fl  Mean Cell Hemoglobin : 29.5 pg  Mean Cell Hemoglobin Concentration : 32.6 gm/dL      01-21    142  |  108  |  34<H>  ----------------------------<  80  3.8   |  25  |  1.51<H>    Ca    12.2<H>      21 Jan 2021 08:03  Phos  3.1     01-21    TPro  6.8  /  Alb  2.8<L>  /  TBili  0.5  /  DBili  x   /  AST  27  /  ALT  16  /  AlkPhos  86  01-21    PT/INR - ( 20 Jan 2021 13:17 )   PT: 12.7 sec;   INR: 1.05 ratio    PTT - ( 20 Jan 2021 13:17 )  PTT:28.5 sec      RADIOLOGY :  CT cervical spine and brain - no acute bleed

## 2021-01-21 NOTE — DIETITIAN INITIAL EVALUATION ADULT. - OTHER INFO
As per H&P, 89 yo male presents with falls s/p chemotherapy. PMH of JOSS, hypercalcemia, gastric lymphoma, sarcoidosis, thrombocytopenia. Pt was asleep at time of assessment. 2/2 to pt sleeping no NFPE formally completed. Observed moderate subcutaneous fat loss (buccal, orbital region) and moderate muscle wasting (temple, clavicle region). Called pt wife to discuss nutrition related hx and obtain food preferences. Wife reports pt used to weigh ~180lbs. She reports pt has had weight loss of about 40lbs (22%) gradually over the last two years. CBW is 136lbs. Pt wife also reports he has some difficulty swallowing and he consumes soft foods. Obtained diet hx PTA.  Pt typically eats x3 small meals and a nutritional supplement (Ensure) daily. Noted pt likes sweet foods (cookies, banana, farina w raisins, ice cream); diet office informed of preferences.

## 2021-01-21 NOTE — PROGRESS NOTE ADULT - PROBLEM SELECTOR PLAN 4
Patient has non-hodgkins lymphoma (gastric)  Check CT c/a/p  Oncology consult noted  Further work-up/management pending clinical course.

## 2021-01-22 LAB
ANION GAP SERPL CALC-SCNC: 6 MMOL/L — SIGNIFICANT CHANGE UP (ref 5–17)
BASOPHILS # BLD AUTO: 0.07 K/UL — SIGNIFICANT CHANGE UP (ref 0–0.2)
BASOPHILS NFR BLD AUTO: 0.6 % — SIGNIFICANT CHANGE UP (ref 0–2)
BUN SERPL-MCNC: 31 MG/DL — HIGH (ref 7–23)
CALCIUM SERPL-MCNC: 11.4 MG/DL — HIGH (ref 8.4–10.5)
CALCIUM SERPL-MCNC: 12 MG/DL — HIGH (ref 8.4–10.5)
CHLORIDE SERPL-SCNC: 106 MMOL/L — SIGNIFICANT CHANGE UP (ref 96–108)
CO2 SERPL-SCNC: 29 MMOL/L — SIGNIFICANT CHANGE UP (ref 22–31)
CREAT SERPL-MCNC: 1.58 MG/DL — HIGH (ref 0.5–1.3)
EOSINOPHIL # BLD AUTO: 0.43 K/UL — SIGNIFICANT CHANGE UP (ref 0–0.5)
EOSINOPHIL NFR BLD AUTO: 3.5 % — SIGNIFICANT CHANGE UP (ref 0–6)
FOLATE SERPL-MCNC: 6.8 NG/ML — SIGNIFICANT CHANGE UP
GLUCOSE SERPL-MCNC: 88 MG/DL — SIGNIFICANT CHANGE UP (ref 70–99)
HCT VFR BLD CALC: 36.4 % — LOW (ref 39–50)
HGB BLD-MCNC: 12.3 G/DL — LOW (ref 13–17)
IMM GRANULOCYTES NFR BLD AUTO: 0.6 % — SIGNIFICANT CHANGE UP (ref 0–1.5)
INR BLD: 1.1 RATIO — SIGNIFICANT CHANGE UP (ref 0.88–1.16)
LDH SERPL L TO P-CCNC: 341 U/L — HIGH (ref 50–242)
LYMPHOCYTES # BLD AUTO: 2.82 K/UL — SIGNIFICANT CHANGE UP (ref 1–3.3)
LYMPHOCYTES # BLD AUTO: 22.8 % — SIGNIFICANT CHANGE UP (ref 13–44)
MAGNESIUM SERPL-MCNC: 2 MG/DL — SIGNIFICANT CHANGE UP (ref 1.6–2.6)
MCHC RBC-ENTMCNC: 30.4 PG — SIGNIFICANT CHANGE UP (ref 27–34)
MCHC RBC-ENTMCNC: 33.8 GM/DL — SIGNIFICANT CHANGE UP (ref 32–36)
MCV RBC AUTO: 89.9 FL — SIGNIFICANT CHANGE UP (ref 80–100)
MONOCYTES # BLD AUTO: 1.09 K/UL — HIGH (ref 0–0.9)
MONOCYTES NFR BLD AUTO: 8.8 % — SIGNIFICANT CHANGE UP (ref 2–14)
NEUTROPHILS # BLD AUTO: 7.91 K/UL — HIGH (ref 1.8–7.4)
NEUTROPHILS NFR BLD AUTO: 63.7 % — SIGNIFICANT CHANGE UP (ref 43–77)
NRBC # BLD: 0 /100 WBCS — SIGNIFICANT CHANGE UP (ref 0–0)
PLATELET # BLD AUTO: 33 K/UL — LOW (ref 150–400)
POTASSIUM SERPL-MCNC: 3.6 MMOL/L — SIGNIFICANT CHANGE UP (ref 3.5–5.3)
POTASSIUM SERPL-SCNC: 3.6 MMOL/L — SIGNIFICANT CHANGE UP (ref 3.5–5.3)
PROTHROM AB SERPL-ACNC: 13.3 SEC — SIGNIFICANT CHANGE UP (ref 10.6–13.6)
RBC # BLD: 4.05 M/UL — LOW (ref 4.2–5.8)
RBC # FLD: 13.5 % — SIGNIFICANT CHANGE UP (ref 10.3–14.5)
SODIUM SERPL-SCNC: 141 MMOL/L — SIGNIFICANT CHANGE UP (ref 135–145)
TSH SERPL-MCNC: 6.33 UIU/ML — HIGH (ref 0.27–4.2)
VIT B12 SERPL-MCNC: 677 PG/ML — SIGNIFICANT CHANGE UP (ref 232–1245)
WBC # BLD: 12.39 K/UL — HIGH (ref 3.8–10.5)
WBC # FLD AUTO: 12.39 K/UL — HIGH (ref 3.8–10.5)

## 2021-01-22 RX ORDER — AMLODIPINE BESYLATE 2.5 MG/1
2.5 TABLET ORAL ONCE
Refills: 0 | Status: COMPLETED | OUTPATIENT
Start: 2021-01-22 | End: 2021-01-22

## 2021-01-22 RX ORDER — CALCITONIN SALMON 200 [IU]/ML
250 INJECTION, SOLUTION INTRAMUSCULAR EVERY 12 HOURS
Refills: 0 | Status: COMPLETED | OUTPATIENT
Start: 2021-01-22 | End: 2021-01-24

## 2021-01-22 RX ADMIN — TAMSULOSIN HYDROCHLORIDE 0.4 MILLIGRAM(S): 0.4 CAPSULE ORAL at 20:57

## 2021-01-22 RX ADMIN — SODIUM CHLORIDE 100 MILLILITER(S): 9 INJECTION INTRAMUSCULAR; INTRAVENOUS; SUBCUTANEOUS at 20:57

## 2021-01-22 RX ADMIN — SODIUM CHLORIDE 100 MILLILITER(S): 9 INJECTION INTRAMUSCULAR; INTRAVENOUS; SUBCUTANEOUS at 06:20

## 2021-01-22 RX ADMIN — CALCITONIN SALMON 250 INTERNATIONAL UNIT(S): 200 INJECTION, SOLUTION INTRAMUSCULAR at 20:57

## 2021-01-22 RX ADMIN — AMLODIPINE BESYLATE 2.5 MILLIGRAM(S): 2.5 TABLET ORAL at 06:08

## 2021-01-22 NOTE — PROGRESS NOTE ADULT - ASSESSMENT
The patient is a 89 year old male with a history of gastric cancer s/p chemotherapy who presents with fall.    Plan:  - Episode more consistent with a mechanical fall rather than syncope  - ECG with LBBB; no prior for comparison  - CT head negative for acute pathology  - Cardiac enzymes negative  - On IV fluids  - Echo with mildly reduced LV systolic function  - Not a candidate for further work-up of heart failure  - Amlodipine discontinued due to orthostatic hypotension. Now resumed at 2.5 mg daily.  - Neurology follow-up  - For possible thoracentesis today

## 2021-01-22 NOTE — PROGRESS NOTE ADULT - PROBLEM SELECTOR PLAN 4
For palliative thoracentesis on Monday  Will transfuse PLTs prior to procedure  Pulmonary consult appreciated For palliative thoracentesis on Monday  Will transfuse PLTs prior to procedure  Pulmonary consult appreciated.

## 2021-01-22 NOTE — PROGRESS NOTE ADULT - ASSESSMENT
89 yo male with history of abdominal non-hodgkin's lymphoma diagnosed in 3/2020 and treated by Dr. Debbie Barrow ([phone]) with Rituxan single agent with only partial response; planned for Rituxan-mini-CHOP combination chemotherapy based on Dr. Barrow' note in 11/2020; finally had infusaport placed in 12/2020 and pre-chemo Echo in 12/2020; presented to the ER with recurrent falls and confusion    - found to have hypercalcemia which I suspect is due to underlying progressive non-hodgkin's lymphoma  - continue hydration  - s/p Pamidronate 60mg renal dose    - left message for Dr. Barrow to discuss care as I believe patient's performance status and comorbidities will limit his ability to pursue further treatment; await call back with her prior to making referrals for palliative care vs. hospice services  - case discussed with Dr. STEVEN Sun (hospitalist)    thrombocytopenia likely due to underlying cancer and poor marrow reserve      RECOMMEND:   Await palliative thoracentesis on Mon  Home with hospice per palliative care discussion with family, which family agreeable with.  Long term prognosis poor given advanced age.   Consider pain meds if bone pain recurs. Appears to be intermittent.     SCD as pt with low plts    Thank you for consulting us.   No additional recommendations at current time.   Will sign off on case for now.   Please call, or re-consult if needed.

## 2021-01-23 DIAGNOSIS — I10 ESSENTIAL (PRIMARY) HYPERTENSION: ICD-10-CM

## 2021-01-23 LAB
ANION GAP SERPL CALC-SCNC: 6 MMOL/L — SIGNIFICANT CHANGE UP (ref 5–17)
BUN SERPL-MCNC: 33 MG/DL — HIGH (ref 7–23)
CALCIUM SERPL-MCNC: 10.6 MG/DL — HIGH (ref 8.4–10.5)
CHLORIDE SERPL-SCNC: 104 MMOL/L — SIGNIFICANT CHANGE UP (ref 96–108)
CO2 SERPL-SCNC: 27 MMOL/L — SIGNIFICANT CHANGE UP (ref 22–31)
CREAT SERPL-MCNC: 1.45 MG/DL — HIGH (ref 0.5–1.3)
GLUCOSE SERPL-MCNC: 118 MG/DL — HIGH (ref 70–99)
HCT VFR BLD CALC: 32.8 % — LOW (ref 39–50)
HGB BLD-MCNC: 10.9 G/DL — LOW (ref 13–17)
MAGNESIUM SERPL-MCNC: 1.8 MG/DL — SIGNIFICANT CHANGE UP (ref 1.6–2.6)
MCHC RBC-ENTMCNC: 29.5 PG — SIGNIFICANT CHANGE UP (ref 27–34)
MCHC RBC-ENTMCNC: 33.2 GM/DL — SIGNIFICANT CHANGE UP (ref 32–36)
MCV RBC AUTO: 88.6 FL — SIGNIFICANT CHANGE UP (ref 80–100)
NRBC # BLD: 0 /100 WBCS — SIGNIFICANT CHANGE UP (ref 0–0)
PLATELET # BLD AUTO: 29 K/UL — LOW (ref 150–400)
POTASSIUM SERPL-MCNC: 3.6 MMOL/L — SIGNIFICANT CHANGE UP (ref 3.5–5.3)
POTASSIUM SERPL-SCNC: 3.6 MMOL/L — SIGNIFICANT CHANGE UP (ref 3.5–5.3)
RBC # BLD: 3.7 M/UL — LOW (ref 4.2–5.8)
RBC # FLD: 13.5 % — SIGNIFICANT CHANGE UP (ref 10.3–14.5)
SODIUM SERPL-SCNC: 137 MMOL/L — SIGNIFICANT CHANGE UP (ref 135–145)
WBC # BLD: 13.53 K/UL — HIGH (ref 3.8–10.5)
WBC # FLD AUTO: 13.53 K/UL — HIGH (ref 3.8–10.5)

## 2021-01-23 RX ORDER — AMLODIPINE BESYLATE 2.5 MG/1
2.5 TABLET ORAL DAILY
Refills: 0 | Status: DISCONTINUED | OUTPATIENT
Start: 2021-01-23 | End: 2021-01-25

## 2021-01-23 RX ADMIN — SODIUM CHLORIDE 100 MILLILITER(S): 9 INJECTION INTRAMUSCULAR; INTRAVENOUS; SUBCUTANEOUS at 16:51

## 2021-01-23 RX ADMIN — TAMSULOSIN HYDROCHLORIDE 0.4 MILLIGRAM(S): 0.4 CAPSULE ORAL at 22:38

## 2021-01-23 RX ADMIN — CALCITONIN SALMON 250 INTERNATIONAL UNIT(S): 200 INJECTION, SOLUTION INTRAMUSCULAR at 06:30

## 2021-01-23 RX ADMIN — CALCITONIN SALMON 250 INTERNATIONAL UNIT(S): 200 INJECTION, SOLUTION INTRAMUSCULAR at 18:53

## 2021-01-23 RX ADMIN — AMLODIPINE BESYLATE 2.5 MILLIGRAM(S): 2.5 TABLET ORAL at 11:18

## 2021-01-23 NOTE — PROGRESS NOTE ADULT - ASSESSMENT
The patient is a 89 year old male with a history of gastric cancer s/p chemotherapy who presents with fall.    Plan:  - Episode more consistent with a mechanical fall rather than syncope  - ECG with LBBB; no prior for comparison  - CT head negative for acute pathology  - Cardiac enzymes negative  - On IV fluids  - Echo with mildly reduced LV systolic function  - Not a candidate for further work-up of heart failure  - Off of antihypertensives due to orthostatic hypotension. Tolerate supine hypertension.  - Neurology follow-up  - Awaiting thoracentesis

## 2021-01-23 NOTE — PROGRESS NOTE ADULT - PROBLEM SELECTOR PLAN 4
For palliative thoracentesis on Monday  Will transfuse PLTs prior to procedure  Pulmonary consult appreciated.

## 2021-01-24 LAB
ABO RH CONFIRMATION: SIGNIFICANT CHANGE UP
ANION GAP SERPL CALC-SCNC: 7 MMOL/L — SIGNIFICANT CHANGE UP (ref 5–17)
BUN SERPL-MCNC: 27 MG/DL — HIGH (ref 7–23)
CALCIUM SERPL-MCNC: 9.9 MG/DL — SIGNIFICANT CHANGE UP (ref 8.4–10.5)
CHLORIDE SERPL-SCNC: 104 MMOL/L — SIGNIFICANT CHANGE UP (ref 96–108)
CO2 SERPL-SCNC: 25 MMOL/L — SIGNIFICANT CHANGE UP (ref 22–31)
CREAT SERPL-MCNC: 1.23 MG/DL — SIGNIFICANT CHANGE UP (ref 0.5–1.3)
GLUCOSE SERPL-MCNC: 98 MG/DL — SIGNIFICANT CHANGE UP (ref 70–99)
HCT VFR BLD CALC: 33 % — LOW (ref 39–50)
HGB BLD-MCNC: 10.9 G/DL — LOW (ref 13–17)
MAGNESIUM SERPL-MCNC: 1.8 MG/DL — SIGNIFICANT CHANGE UP (ref 1.6–2.6)
MCHC RBC-ENTMCNC: 29.8 PG — SIGNIFICANT CHANGE UP (ref 27–34)
MCHC RBC-ENTMCNC: 33 GM/DL — SIGNIFICANT CHANGE UP (ref 32–36)
MCV RBC AUTO: 90.2 FL — SIGNIFICANT CHANGE UP (ref 80–100)
NRBC # BLD: 0 /100 WBCS — SIGNIFICANT CHANGE UP (ref 0–0)
PLATELET # BLD AUTO: 27 K/UL — LOW (ref 150–400)
POTASSIUM SERPL-MCNC: 3.4 MMOL/L — LOW (ref 3.5–5.3)
POTASSIUM SERPL-SCNC: 3.4 MMOL/L — LOW (ref 3.5–5.3)
RBC # BLD: 3.66 M/UL — LOW (ref 4.2–5.8)
RBC # FLD: 13.5 % — SIGNIFICANT CHANGE UP (ref 10.3–14.5)
SARS-COV-2 RNA SPEC QL NAA+PROBE: SIGNIFICANT CHANGE UP
SODIUM SERPL-SCNC: 136 MMOL/L — SIGNIFICANT CHANGE UP (ref 135–145)
WBC # BLD: 13.48 K/UL — HIGH (ref 3.8–10.5)
WBC # FLD AUTO: 13.48 K/UL — HIGH (ref 3.8–10.5)

## 2021-01-24 RX ORDER — POTASSIUM CHLORIDE 20 MEQ
40 PACKET (EA) ORAL DAILY
Refills: 0 | Status: DISCONTINUED | OUTPATIENT
Start: 2021-01-24 | End: 2021-01-24

## 2021-01-24 RX ADMIN — Medication 40 MILLIEQUIVALENT(S): at 12:02

## 2021-01-24 RX ADMIN — TAMSULOSIN HYDROCHLORIDE 0.4 MILLIGRAM(S): 0.4 CAPSULE ORAL at 21:26

## 2021-01-24 RX ADMIN — CALCITONIN SALMON 250 INTERNATIONAL UNIT(S): 200 INJECTION, SOLUTION INTRAMUSCULAR at 06:11

## 2021-01-24 RX ADMIN — SODIUM CHLORIDE 100 MILLILITER(S): 9 INJECTION INTRAMUSCULAR; INTRAVENOUS; SUBCUTANEOUS at 06:10

## 2021-01-24 RX ADMIN — AMLODIPINE BESYLATE 2.5 MILLIGRAM(S): 2.5 TABLET ORAL at 06:10

## 2021-01-24 NOTE — PROGRESS NOTE ADULT - PROBLEM SELECTOR PLAN 3
Likely related to NHL  Monitor CBC  Transfuse PLTs prior to thoracentesis in AM  No evidence of bleeding  Onc f/u

## 2021-01-24 NOTE — CHART NOTE - NSCHARTNOTEFT_GEN_A_CORE
Assessment:       Initial 1/21:  89 yo male presents with falls s/p chemotherapy. PMH of JOSS, hypercalcemia, gastric lymphoma, sarcoidosis, thrombocytopenia. Pt was asleep at time of assessment. 2/2 to pt sleeping no NFPE formally completed. Observed moderate subcutaneous fat loss (buccal, orbital region) and moderate muscle wasting (temple, clavicle region). Called pt wife to discuss nutrition related hx and obtain food preferences. Wife reports pt used to weigh ~180lbs. She reports pt has had weight loss of about 40lbs (22%) gradually over the last two years.    F/U 1/24:  Pt consuming 50-75% meals/supplements. Calcium levels improved. Creatinine appears at baseline based on MD note. Pt with PE: Plan is for thoracentesis Monday.    Factors impacting intake: [ ] none [ ] nausea  [ ] vomiting [ ] diarrhea [ ] constipation  [ ]chewing problems [ ] swallowing issues  [ ] other:     Diet Presciption: Diet, Regular (01-20-21 @ 16:45)    Intake: 50-75%    Current Weight: Weight (kg): 61.7 (01-20 @ 12:23)  % Weight Change    Pertinent Medications: MEDICATIONS  (STANDING):  amLODIPine   Tablet 2.5 milliGRAM(s) Oral daily  potassium chloride    Tablet ER 40 milliEquivalent(s) Oral daily  tamsulosin 0.4 milliGRAM(s) Oral at bedtime    MEDICATIONS  (PRN):    Pertinent Labs: 01-24 Na136 mmol/L Glu 98 mg/dL K+ 3.4 mmol/L<L> Cr  1.23 mg/dL BUN 27 mg/dL<H> 01-21 Phos 3.1 mg/dL 01-21 Alb 3.2 g/dL<L>     CAPILLARY BLOOD GLUCOSE        Skin:     Estimated Needs:   [x ] no change since previous assessment  [ ] recalculated:     Previous Nutrition Diagnosis:   [ ] Inadequate Energy Intake [ ]Inadequate Oral Intake [ ] Excessive Energy Intake   [ ] Underweight [ ] Increased Nutrient Needs [ ] Overweight/Obesity [x] unintentional wt loss  [ ] Altered GI Function [ ] Unintended Weight Loss [ ] Food & Nutrition Related Knowledge Deficit [ ] Malnutrition     Nutrition Diagnosis is [ x] ongoing  [ ] resolved [ ] not applicable     New Nutrition Diagnosis: [ ] not applicable       Interventions:   Recommend  [ ] Change Diet To:  [ ] Nutrition Supplement  [ ] Nutrition Support  [ x] Other:   continur POC    Monitoring and Evaluation:   [ x] PO intake [ x ] Tolerance to diet prescription [ x ] weights [ x ] labs[ x ] follow up per protocol  [ ] other:
Spoke to patient's outpatient oncologist, Dr Debbie Barrow. Patient was due to start chemotherapy tomorrow because of patient's insistence. Patient was recommended to pursue palliative or hospice care.   CT results noted. Results discussed with wife. CT shows advanced disease. In light of patient's poor functional status, he would not be a candidate for any treatment at this time. She is agreeable to home hospice care. She reports that patient has a living will which states that he is a DNR/DNI. Patient is confused at times. Will get psych to assess for capacity. I'll complete MOLST with wife in AM. SW to make hospice referral.
Called regarding HTN.  Patient was on amlodipine 5mg but was stopped yesterday (unclear reason, had one reading of hypotension during the day).  Will re-order amlodipine but at a lower dose of 2.5mg.

## 2021-01-25 ENCOUNTER — TRANSCRIPTION ENCOUNTER (OUTPATIENT)
Age: 86
End: 2021-01-25

## 2021-01-25 VITALS
TEMPERATURE: 98 F | RESPIRATION RATE: 19 BRPM | HEART RATE: 74 BPM | DIASTOLIC BLOOD PRESSURE: 70 MMHG | OXYGEN SATURATION: 96 % | SYSTOLIC BLOOD PRESSURE: 115 MMHG

## 2021-01-25 LAB — ACE SERPL-CCNC: 33 U/L — SIGNIFICANT CHANGE UP (ref 14–82)

## 2021-01-25 PROCEDURE — 84165 PROTEIN E-PHORESIS SERUM: CPT

## 2021-01-25 PROCEDURE — 96360 HYDRATION IV INFUSION INIT: CPT

## 2021-01-25 PROCEDURE — 84484 ASSAY OF TROPONIN QUANT: CPT

## 2021-01-25 PROCEDURE — 70450 CT HEAD/BRAIN W/O DYE: CPT

## 2021-01-25 PROCEDURE — 82652 VIT D 1 25-DIHYDROXY: CPT

## 2021-01-25 PROCEDURE — 73080 X-RAY EXAM OF ELBOW: CPT

## 2021-01-25 PROCEDURE — 36415 COLL VENOUS BLD VENIPUNCTURE: CPT

## 2021-01-25 PROCEDURE — 83519 RIA NONANTIBODY: CPT

## 2021-01-25 PROCEDURE — 97530 THERAPEUTIC ACTIVITIES: CPT

## 2021-01-25 PROCEDURE — 86900 BLOOD TYPING SEROLOGIC ABO: CPT

## 2021-01-25 PROCEDURE — 84155 ASSAY OF PROTEIN SERUM: CPT

## 2021-01-25 PROCEDURE — 72110 X-RAY EXAM L-2 SPINE 4/>VWS: CPT

## 2021-01-25 PROCEDURE — 71045 X-RAY EXAM CHEST 1 VIEW: CPT

## 2021-01-25 PROCEDURE — 82164 ANGIOTENSIN I ENZYME TEST: CPT

## 2021-01-25 PROCEDURE — 80048 BASIC METABOLIC PNL TOTAL CA: CPT

## 2021-01-25 PROCEDURE — 86901 BLOOD TYPING SEROLOGIC RH(D): CPT

## 2021-01-25 PROCEDURE — 84550 ASSAY OF BLOOD/URIC ACID: CPT

## 2021-01-25 PROCEDURE — 86334 IMMUNOFIX E-PHORESIS SERUM: CPT

## 2021-01-25 PROCEDURE — 84443 ASSAY THYROID STIM HORMONE: CPT

## 2021-01-25 PROCEDURE — 72125 CT NECK SPINE W/O DYE: CPT

## 2021-01-25 PROCEDURE — 87086 URINE CULTURE/COLONY COUNT: CPT

## 2021-01-25 PROCEDURE — 83970 ASSAY OF PARATHORMONE: CPT

## 2021-01-25 PROCEDURE — 74176 CT ABD & PELVIS W/O CONTRAST: CPT

## 2021-01-25 PROCEDURE — 99285 EMERGENCY DEPT VISIT HI MDM: CPT

## 2021-01-25 PROCEDURE — 93306 TTE W/DOPPLER COMPLETE: CPT

## 2021-01-25 PROCEDURE — 81001 URINALYSIS AUTO W/SCOPE: CPT

## 2021-01-25 PROCEDURE — 86850 RBC ANTIBODY SCREEN: CPT

## 2021-01-25 PROCEDURE — 85025 COMPLETE CBC W/AUTO DIFF WBC: CPT

## 2021-01-25 PROCEDURE — 82310 ASSAY OF CALCIUM: CPT

## 2021-01-25 PROCEDURE — 82962 GLUCOSE BLOOD TEST: CPT

## 2021-01-25 PROCEDURE — 71250 CT THORAX DX C-: CPT

## 2021-01-25 PROCEDURE — 80053 COMPREHEN METABOLIC PANEL: CPT

## 2021-01-25 PROCEDURE — 82607 VITAMIN B-12: CPT

## 2021-01-25 PROCEDURE — 93005 ELECTROCARDIOGRAM TRACING: CPT

## 2021-01-25 PROCEDURE — 83615 LACTATE (LD) (LDH) ENZYME: CPT

## 2021-01-25 PROCEDURE — 85730 THROMBOPLASTIN TIME PARTIAL: CPT

## 2021-01-25 PROCEDURE — 84100 ASSAY OF PHOSPHORUS: CPT

## 2021-01-25 PROCEDURE — 83735 ASSAY OF MAGNESIUM: CPT

## 2021-01-25 PROCEDURE — 97161 PT EVAL LOW COMPLEX 20 MIN: CPT

## 2021-01-25 PROCEDURE — 86769 SARS-COV-2 COVID-19 ANTIBODY: CPT

## 2021-01-25 PROCEDURE — 82746 ASSAY OF FOLIC ACID SERUM: CPT

## 2021-01-25 PROCEDURE — U0003: CPT

## 2021-01-25 PROCEDURE — U0005: CPT

## 2021-01-25 PROCEDURE — 82306 VITAMIN D 25 HYDROXY: CPT

## 2021-01-25 PROCEDURE — 85610 PROTHROMBIN TIME: CPT

## 2021-01-25 PROCEDURE — 97116 GAIT TRAINING THERAPY: CPT

## 2021-01-25 PROCEDURE — 85027 COMPLETE CBC AUTOMATED: CPT

## 2021-01-25 RX ORDER — AMLODIPINE BESYLATE 2.5 MG/1
1 TABLET ORAL
Qty: 30 | Refills: 0
Start: 2021-01-25 | End: 2021-02-23

## 2021-01-25 RX ORDER — TAMSULOSIN HYDROCHLORIDE 0.4 MG/1
1 CAPSULE ORAL
Qty: 0 | Refills: 0 | DISCHARGE

## 2021-01-25 RX ORDER — TAMSULOSIN HYDROCHLORIDE 0.4 MG/1
1 CAPSULE ORAL
Qty: 30 | Refills: 0
Start: 2021-01-25 | End: 2021-02-23

## 2021-01-25 RX ADMIN — AMLODIPINE BESYLATE 2.5 MILLIGRAM(S): 2.5 TABLET ORAL at 05:36

## 2021-01-25 NOTE — DISCHARGE NOTE NURSING/CASE MANAGEMENT/SOCIAL WORK - PATIENT PORTAL LINK FT
You can access the FollowMyHealth Patient Portal offered by [facility] by registering at the following website: [email or web address]. By joining [employer]’s FollowMyHealth portal, you will also be able to view your health information using other applications (apps) compatible with our system.

## 2021-01-25 NOTE — PROGRESS NOTE ADULT - PROBLEM SELECTOR PLAN 5
Start norvasc 2.5mg po qd
Continue norvasc 2.5mg po qd
Continue norvasc 2.5mg po qd
PT  Will need AMANDO
Patient has non-hodgkins lymphoma (gastric)  Family wants home hospice -- referral made  Oncology consult noted  Further work-up/management pending clinical course.

## 2021-01-25 NOTE — PROGRESS NOTE ADULT - PROVIDER SPECIALTY LIST ADULT
Neurology
Cardiology
Neurology
Cardiology
Cardiology
Nephrology
Pulmonology
Heme/Onc
Internal Medicine
Pulmonology
Internal Medicine

## 2021-01-25 NOTE — PROGRESS NOTE ADULT - PROBLEM SELECTOR PLAN 2
Cr appears at baseline  IVF  Avoid nephrotoxic medications  Monitor BMP  Renal consult noted  Further work-up/management pending clinical course.
IVF  Avoid nephrotoxic medications  Monitor BMP  Renal consult noted  Further work-up/management pending clinical course.
Cr appears at baseline  Avoid nephrotoxic medications  Monitor BMP  Renal consult noted  Further work-up/management pending clinical course.
Cr appears at baseline  Avoid nephrotoxic medications  Renal consult noted
Cr appears at baseline  IVF  Avoid nephrotoxic medications  Monitor BMP  Renal consult noted  Further work-up/management pending clinical course.

## 2021-01-25 NOTE — PROGRESS NOTE ADULT - PROBLEM SELECTOR PLAN 1
Likely hypercalcemia of malignancy  Improving  IVF  S/P palimodrate 60mg x 1  S/P lasix 20 x 1  Serial labs  Renal consult noted  Further work-up/management pending clinical course.
plan for thoracentesis on Monday with IR - optimization of Plt count - heme following -   89 M with PMH of HLD BPH gastric cancer diagnosed 3/2020 who came in c/o multiple falls. Patient has been mostly bed ridden recently as per wife. Patient walks with cane  mets malignancy - hypercalcemia management in progress - serial Ca++ monitoring   pleural eff - suspicion for malignant pleural eff -   ct scan reviewed  DNR DNI as per WIFE  supportive medical regimen  plan for Palliative Thoracentesis - left eff - with IR -   monitor for needs, assist with ADL - fall prec - nutrition - assess for pain, distress  on Flomax for BPH  poss Home Hospice dispo -.
plan for thoracentesis on Monday with IR - with optimization of Plt count  89 M with PMH of HLD BPH gastric cancer diagnosed 3/2020 who came in c/o multiple falls. Patient has been mostly bed ridden recently as per wife. Patient walks with cane  mets malignancy - hypercalcemia management in progress - serial Ca++ monitoring   pleural eff - suspicion for malignant pleural eff -   ct scan reviewed  DNR DNI as per WIFE  supportive medical regimen  plan for Palliative Thoracentesis - left eff - with IR -   monitor for needs, assist with ADL - fall prec - nutrition - assess for pain, distress  on Flomax for BPH  poss Home Hospice dispo -.
thoracentesis cancelled - risk outweighs benefit - discussed with Hem onc - PMD and IR  89 M with PMH of HLD BPH gastric cancer diagnosed 3/2020 who came in c/o multiple falls. Patient has been mostly bed ridden recently as per wife. Patient walks with cane  mets malignancy - hypercalcemia management in progress - serial Ca++ monitoring   pleural eff - suspicion for malignant pleural eff -   ct scan reviewed  DNR DNI as per WIFE  supportive medical regimen  monitor for needs, assist with ADL - fall prec - nutrition - assess for pain, distress  on Flomax for BPH  poss Home Hospice dispo -.
Likely hypercalcemia of malignancy  Improving  IVF  S/P palimodrate 60mg x 1  On calcitonin x 4 doses  S/P lasix 20 x 1  Serial labs  Renal consult noted  Further work-up/management pending clinical course.
Likely hypercalcemia of malignancy  Improving  IVF  palimodrate 60mg x 1  lasix 20 x 1  Serial labs  Renal consult noted  Further work-up/management pending clinical course.
Poss thoracentesis with IR today   overnight events noted  89 M with PMH of HLD BPH gastric cancer diagnosed 3/2020 who came in c/o multiple falls. Patient has been mostly bed ridden recently as per wife. Patient walks with cane  mets malignancy  pleural eff - suspicion for malignant pleural eff -   ct scan reviewed  DNR DNI as per WIFE  supportive medical regimen  plan for Palliative Thoracentesis - left eff - with IR -   monitor for needs, assist with ADL - fall prec - nutrition - assess for pain, distress  on Flomax for BPH  poss Home Hospice dispo -.
Likely hypercalcemia of malignancy  Improving  S/P palimodrate 60mg x 1  On calcitonin x 4 doses  S/P lasix 20 x 1  Serial labs  Renal consult noted  Further work-up/management pending clinical course.
Likely hypercalcemia of malignancy  Improved  S/P palimodrate 60mg x 1  S/P calcitonin x 4 doses  S/P lasix 20 x 1  Renal consult noted

## 2021-01-25 NOTE — PROGRESS NOTE ADULT - ASSESSMENT
The patient is a 89 year old male with a history of gastric cancer s/p chemotherapy who presents with fall.    Plan:  - Episode more consistent with a mechanical fall rather than syncope  - ECG with LBBB; no prior for comparison  - CT head negative for acute pathology  - Cardiac enzymes negative  - On IV fluids  - Echo with mildly reduced LV systolic function  - Not a candidate for further work-up of heart failure  - Off of antihypertensives due to orthostatic hypotension. Tolerate supine hypertension.  - Neurology follow-up  - Discharge planning

## 2021-01-25 NOTE — PROGRESS NOTE ADULT - PROBLEM SELECTOR PROBLEM 1
Pleural effusion
Hypercalcemia

## 2021-01-25 NOTE — PROGRESS NOTE ADULT - REASON FOR ADMISSION
multiple falls

## 2021-01-25 NOTE — PROGRESS NOTE ADULT - PROBLEM SELECTOR PROBLEM 5
Essential hypertension
Essential hypertension
Ambulatory dysfunction
GI cancer
Essential hypertension

## 2021-01-25 NOTE — PROGRESS NOTE ADULT - SUBJECTIVE AND OBJECTIVE BOX
Chief Complaint: Fall    Interval Events: No events overnight.    Review of Systems:  General: No fevers, chills, weight loss or gain  Skin: No rashes, color changes  Cardiovascular: No chest pain, orthopnea  Respiratory: No shortness of breath, cough  Gastrointestinal: No nausea, abdominal pain  Genitourinary: No incontinence, pain with urination  Musculoskeletal: No pain, swelling, decreased range of motion  Neurological: No headache, weakness  Psychiatric: No depression, anxiety  Endocrine: No weight loss or gain, increased thirst  All other systems are comprehensively negative.    Physical Exam:  Vital Signs Last 24 Hrs  T(C): 36.6 (22 Jan 2021 08:18), Max: 37.1 (21 Jan 2021 10:26)  T(F): 97.8 (22 Jan 2021 08:18), Max: 98.8 (21 Jan 2021 10:26)  HR: 73 (22 Jan 2021 08:18) (73 - 82)  BP: 169/63 (22 Jan 2021 08:18) (95/52 - 179/67)  BP(mean): --  RR: 16 (22 Jan 2021 08:18) (16 - 18)  SpO2: 95% (22 Jan 2021 08:18) (93% - 98%)  General: NAD  HEENT: MMM  Neck: No JVD, no carotid bruit  Lungs: CTAB  CV: RRR, nl S1/S2, no M/R/G  Abdomen: S/NT/ND, +BS  Extremities: No LE edema, no cyanosis  Neuro: AAOx3, non-focal  Skin: No rash    Labs:             01-22    141  |  106  |  31<H>  ----------------------------<  88  3.6   |  29  |  1.58<H>    Ca    12.0<H>      22 Jan 2021 07:52  Phos  3.1     01-21  Mg     2.0     01-22    TPro  x   /  Alb  3.2<L>  /  TBili  x   /  DBili  x   /  AST  x   /  ALT  x   /  AlkPhos  x   01-21                        12.3   12.39 )-----------( 33       ( 22 Jan 2021 07:52 )             36.4       Telemetry: Sinus rhythm
Chief Complaint: Fall    Interval Events: No events overnight.    Review of Systems:  General: No fevers, chills, weight loss or gain  Skin: No rashes, color changes  Cardiovascular: No chest pain, orthopnea  Respiratory: No shortness of breath, cough  Gastrointestinal: No nausea, abdominal pain  Genitourinary: No incontinence, pain with urination  Musculoskeletal: No pain, swelling, decreased range of motion  Neurological: No headache, weakness  Psychiatric: No depression, anxiety  Endocrine: No weight loss or gain, increased thirst  All other systems are comprehensively negative.    Physical Exam:  Vitals:        Vital Signs Last 24 Hrs  T(C): 36.6 (21 Jan 2021 00:28), Max: 36.8 (20 Jan 2021 16:55)  T(F): 97.9 (21 Jan 2021 00:28), Max: 98.2 (20 Jan 2021 16:55)  HR: 58 (21 Jan 2021 05:15) (58 - 78)  BP: 146/60 (21 Jan 2021 05:15) (146/60 - 178/70)  BP(mean): --  RR: 18 (21 Jan 2021 00:28) (16 - 18)  SpO2: 96% (21 Jan 2021 00:28) (96% - 98%)  General: NAD  HEENT: MMM  Neck: No JVD, no carotid bruit  Lungs: CTAB  CV: RRR, nl S1/S2, no M/R/G  Abdomen: S/NT/ND, +BS  Extremities: No LE edema, no cyanosis  Neuro: AAOx3, non-focal  Skin: No rash    Labs:                        11.6   14.07 )-----------( x        ( 21 Jan 2021 08:03 )             35.6     01-21    142  |  108  |  34<H>  ----------------------------<  80  3.8   |  25  |  1.51<H>    Ca    12.2<H>      21 Jan 2021 08:03  Phos  3.1     01-21    TPro  6.8  /  Alb  2.8<L>  /  TBili  0.5  /  DBili  x   /  AST  27  /  ALT  16  /  AlkPhos  86  01-21    CARDIAC MARKERS ( 20 Jan 2021 13:17 )  .013 ng/mL / x     / x     / x     / x          PT/INR - ( 20 Jan 2021 13:17 )   PT: 12.7 sec;   INR: 1.05 ratio         PTT - ( 20 Jan 2021 13:17 )  PTT:28.5 sec    Telemetry: Sinus rhythm
NEPHROLOGY PROGRESS NOTE    CHIEF COMPLAINT:  JOSS, hypercalcemia    HPI:  Renal function and calcium slowly better.    ROS:  denies SOB    EXAM:  T(F): 97.5 (21 @ 10:59)  HR: 74 (21 @ 10:59)  BP: 122/53 (21 @ 10:59)  RR: 17 (21 @ 10:59)  SpO2: 97% (21 @ 10:59)    Conversant, in no apparent distress  Normal respiratory effort, lungs clear bilaterally  Heart RRR with no murmur, no peripheral edema         LABS                             12.3   12.39 )-----------( 33       ( 2021 07:52 )             36.4              141  |  106  |  31<H>  ----------------------------<  88  3.6   |  29  |  1.58<H>    Ca    12.0<H>      2021 07:52  Phos  3.1       Mg     2.0         PTH 6  25D=low  1,25D=normal  SPEP - contains small M-spike    TPro  x   /  Alb  3.2<L>  /  TBili  x   /  DBili  x   /  AST  x   /  ALT  x   /  AlkPhos  x       Urinalysis Basic - ( 2021 14:38 )  Color: Yellow / Appearance: Clear / S.025 / pH: x  Gluc: x / Ketone: Negative  / Bili: Negative / Urobili: Negative mg/dL   Blood: x / Protein: 30 mg/dL / Nitrite: Negative   Leuk Esterase: Trace / RBC: 3-5 /HPF / WBC 3-5   Sq Epi: x / Non Sq Epi: Few / Bacteria: Few      Assessment	  JOSS: Prerenal azotemia, better  Hypercalcemia, not PTH mediated,  risk factors include lymphoma, sarcoidosis and ? myeloma    Recommend  Continue IVF  Monitor response to pamidronate  Daily BMP      
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 Patient has been mostly bed ridden recently as per wife. Patient walks with cane. He got up to go to bathroom yesterday and he fell. Denies dizziness, lightheadedness, palpitations. Patient has had about 4 falls in the past couple of months. He had immunotherapy several months ago (3 rounds). He was to start chemotherapy this week. Wife states that his blood was "normal" about a month ago. + weight loss over the past several months. Denies CP, fevers, chills, abdominal, n/v/d. (20 Jan 2021 17:10)        MEDICATIONS  (STANDING):  sodium chloride 0.9%. 1000 milliLiter(s) (100 mL/Hr) IV Continuous <Continuous>  tamsulosin 0.4 milliGRAM(s) Oral at bedtime    MEDICATIONS  (PRN):      Vital Signs Last 24 Hrs  T(C): 36.4 (22 Jan 2021 10:59), Max: 36.9 (22 Jan 2021 05:19)  T(F): 97.5 (22 Jan 2021 10:59), Max: 98.5 (22 Jan 2021 05:19)  HR: 74 (22 Jan 2021 10:59) (73 - 82)  BP: 122/53 (22 Jan 2021 10:59) (95/52 - 179/67)  BP(mean): --  RR: 17 (22 Jan 2021 10:59) (16 - 18)  SpO2: 97% (22 Jan 2021 10:59) (94% - 98%)      [PHYSICAL EXAM]  General: adult in NAD,  WN,  WD.  HEENT: clear oropharynx, anicteric sclera, pink conjunctivae.  Neck: supple, no masses.  CV: normal S1S2, no murmur, no rubs, no gallops.  Lungs: BL dec to auscultation, no wheezes, no rales, no rhonchi.  Abdomen: soft, non-tender, non-distended, no hepatosplenomegaly, normal BS, no guarding.  Ext: no clubbing, no cyanosis, no edema.  Skin: no rashes,  no petechiae, no venous stasis changes.  Neuro: alert and oriented X2  , no focal motor deficits.  LN: no SC BRITANY.      [LABS:]                        12.3   12.39 )-----------( 33       ( 22 Jan 2021 07:52 )             36.4     01-22    141  |  106  |  31<H>  ----------------------------<  88  3.6   |  29  |  1.58<H>    Ca    12.0<H>      22 Jan 2021 07:52  Phos  3.1     01-21  Mg     2.0     01-22    TPro  x   /  Alb  3.2<L>  /  TBili  x   /  DBili  x   /  AST  x   /  ALT  x   /  AlkPhos  x   01-21    PT/INR - ( 22 Jan 2021 07:52 )   PT: 13.3 sec;   INR: 1.10 ratio         PTT - ( 20 Jan 2021 13:17 )  PTT:28.5 sec      Lactate Dehydrogenase, Serum: 484: Test Repeated   Mild hemolysis. Results may be falsely elevated. U/L (01.21.21 @ 19:00)       [RADIOLOGY STUDIES:]  
Chief Complaint: Fall    Interval Events: No events overnight.    Review of Systems:  General: No fevers, chills, weight loss or gain  Skin: No rashes, color changes  Cardiovascular: No chest pain, orthopnea  Respiratory: No shortness of breath, cough  Gastrointestinal: No nausea, abdominal pain  Genitourinary: No incontinence, pain with urination  Musculoskeletal: No pain, swelling, decreased range of motion  Neurological: No headache, weakness  Psychiatric: No depression, anxiety  Endocrine: No weight loss or gain, increased thirst  All other systems are comprehensively negative.    Physical Exam:  Vital Signs Last 24 Hrs  T(C): 36.7 (25 Jan 2021 05:35), Max: 37.1 (25 Jan 2021 05:14)  T(F): 98 (25 Jan 2021 05:35), Max: 98.7 (25 Jan 2021 05:14)  HR: 79 (25 Jan 2021 05:35) (75 - 95)  BP: 178/74 (25 Jan 2021 05:35) (110/53 - 178/74)  BP(mean): --  RR: 17 (25 Jan 2021 05:35) (16 - 18)  SpO2: 97% (25 Jan 2021 05:35) (95% - 97%)  General: NAD  HEENT: MMM  Neck: No JVD, no carotid bruit  Lungs: CTAB  CV: RRR, nl S1/S2, no M/R/G  Abdomen: S/NT/ND, +BS  Extremities: No LE edema, no cyanosis  Neuro: AAOx3, non-focal  Skin: No rash    Labs:             01-24    136  |  104  |  27<H>  ----------------------------<  98  3.4<L>   |  25  |  1.23    Ca    9.9      24 Jan 2021 06:53  Mg     1.8     01-24                          10.9   13.48 )-----------( 27       ( 24 Jan 2021 06:53 )             33.0     
Chief Complaint: Fall    Interval Events: No events overnight.    Review of Systems:  General: No fevers, chills, weight loss or gain  Skin: No rashes, color changes  Cardiovascular: No chest pain, orthopnea  Respiratory: No shortness of breath, cough  Gastrointestinal: No nausea, abdominal pain  Genitourinary: No incontinence, pain with urination  Musculoskeletal: No pain, swelling, decreased range of motion  Neurological: No headache, weakness  Psychiatric: No depression, anxiety  Endocrine: No weight loss or gain, increased thirst  All other systems are comprehensively negative.    Physical Exam:  Vital Signs Last 24 Hrs  T(C): 36.8 (23 Jan 2021 06:00), Max: 36.8 (22 Jan 2021 19:20)  T(F): 98.2 (23 Jan 2021 06:00), Max: 98.2 (22 Jan 2021 19:20)  HR: 85 (23 Jan 2021 06:00) (74 - 85)  BP: 184/78 (23 Jan 2021 06:00) (109/56 - 184/78)  BP(mean): --  RR: 16 (23 Jan 2021 06:00) (16 - 17)  SpO2: 96% (23 Jan 2021 06:00) (96% - 97%)  General: NAD  HEENT: MMM  Neck: No JVD, no carotid bruit  Lungs: CTAB  CV: RRR, nl S1/S2, no M/R/G  Abdomen: S/NT/ND, +BS  Extremities: No LE edema, no cyanosis  Neuro: AAOx3, non-focal  Skin: No rash    Labs:             01-22    141  |  106  |  31<H>  ----------------------------<  88  3.6   |  29  |  1.58<H>    Ca    12.0<H>      22 Jan 2021 07:52  Mg     2.0     01-22    TPro  x   /  Alb  3.2<L>  /  TBili  x   /  DBili  x   /  AST  x   /  ALT  x   /  AlkPhos  x   01-21                        12.3   12.39 )-----------( 33       ( 22 Jan 2021 07:52 )             36.4     
Chief Complaint: Fall    Interval Events: No events overnight.    Review of Systems:  General: No fevers, chills, weight loss or gain  Skin: No rashes, color changes  Cardiovascular: No chest pain, orthopnea  Respiratory: No shortness of breath, cough  Gastrointestinal: No nausea, abdominal pain  Genitourinary: No incontinence, pain with urination  Musculoskeletal: No pain, swelling, decreased range of motion  Neurological: No headache, weakness  Psychiatric: No depression, anxiety  Endocrine: No weight loss or gain, increased thirst  All other systems are comprehensively negative.    Physical Exam:  Vital Signs Last 24 Hrs  T(C): 36.8 (24 Jan 2021 05:15), Max: 37.1 (23 Jan 2021 09:30)  T(F): 98.3 (24 Jan 2021 05:15), Max: 98.8 (23 Jan 2021 09:30)  HR: 83 (24 Jan 2021 05:15) (80 - 84)  BP: 190/78 (24 Jan 2021 05:15) (163/67 - 190/78)  BP(mean): --  RR: 16 (24 Jan 2021 05:15) (16 - 18)  SpO2: 95% (24 Jan 2021 05:15) (95% - 97%)  General: NAD  HEENT: MMM  Neck: No JVD, no carotid bruit  Lungs: CTAB  CV: RRR, nl S1/S2, no M/R/G  Abdomen: S/NT/ND, +BS  Extremities: No LE edema, no cyanosis  Neuro: AAOx3, non-focal  Skin: No rash    Labs:             01-24    136  |  104  |  27<H>  ----------------------------<  98  3.4<L>   |  25  |  1.23    Ca    9.9      24 Jan 2021 06:53  Mg     1.8     01-24                          10.9   13.48 )-----------( 27       ( 24 Jan 2021 06:53 )             33.0     
Neurology follow up note    MARIA DEL ROSARIO [id number]      Interval History:    Patient feels ok    MEDICATIONS    amLODIPine   Tablet 2.5 milliGRAM(s) Oral daily  tamsulosin 0.4 milliGRAM(s) Oral at bedtime      Allergies    No Known Allergies    Intolerances            Vital Signs Last 24 Hrs  T(C): 36.4 (25 Jan 2021 14:04), Max: 37.1 (25 Jan 2021 05:14)  T(F): 97.5 (25 Jan 2021 14:04), Max: 98.7 (25 Jan 2021 05:14)  HR: 74 (25 Jan 2021 14:04) (70 - 86)  BP: 143/69 (25 Jan 2021 14:04) (110/53 - 178/74)  BP(mean): --  RR: 18 (25 Jan 2021 14:04) (16 - 18)  SpO2: 96% (25 Jan 2021 14:04) (95% - 97%)      REVIEW OF SYSTEMS:  Constitutional:  The patient denies fever, chills, or night sweats.  Head:  No headache.  Eyes:  No double vision or blurry vision.  Ears:  No ringing in the ears.  Neck:  No neck pain.  Respiratory:  No shortness of breath.  Cardiovascular:  No chest pain.  Abdomen:  No nausea, vomiting, or abdominal pain.  Extremities/Neurological:  No numbness or tingling.  Musculoskeletal:  Occasional joint pain.  Genitourinary:  No burning upon urination.  General:  Positive history of lower back pain.    PHYSICAL EXAMINATION:   HEENT:  Head:  Normocephalic, atraumatic.  Eyes:  No scleral icterus.  Ears:  Hearing bilaterally intact.  NECK:  Supple.  RESPIRATORY:  Good air entry bilaterally.  CARDIOVASCULAR:  S1 and S2 heard.  ABDOMEN:  Soft and nontender.  EXTREMITIES:  No clubbing or cyanosis were noted.      NEUROLOGIC:  The patient is awake, alert, appears forgetful at times   Extraocular movement were intact.  Pupils were equal, round, and reactive bilaterally 3 mm to 2 mm.  Speech was fluent.  Smile was symmetric.  Motor:  Bilateral upper were 4+/5, bilateral lower were 4-/5.  Sensory:  Bilateral upper and lower were intact to light touch.  No drift.  Finger-to-nose within normal limits.               LABS:  CBC Full  -  ( 24 Jan 2021 06:53 )  WBC Count : 13.48 K/uL  RBC Count : 3.66 M/uL  Hemoglobin : 10.9 g/dL  Hematocrit : 33.0 %  Platelet Count - Automated : 27 K/uL  Mean Cell Volume : 90.2 fl  Mean Cell Hemoglobin : 29.8 pg  Mean Cell Hemoglobin Concentration : 33.0 gm/dL  Auto Neutrophil # : x  Auto Lymphocyte # : x  Auto Monocyte # : x  Auto Eosinophil # : x  Auto Basophil # : x  Auto Neutrophil % : x  Auto Lymphocyte % : x  Auto Monocyte % : x  Auto Eosinophil % : x  Auto Basophil % : x      01-24    136  |  104  |  27<H>  ----------------------------<  98  3.4<L>   |  25  |  1.23    Ca    9.9      24 Jan 2021 06:53  Mg     1.8     01-24      Hemoglobin A1C:       Vitamin B12         RADIOLOGY      ANALYSIS AND PLAN:  A 89-year-old with an episode of falls.  For episode of falls and ataxia, suspect this is multifactorial secondary to age-related changes, possibly underlying spinal disc disease and possibly underlying peripheral neuropathy.  The patient has significantly impaired proprioception of bilateral lower extremities.  Also from chemistries, the patient does have hypercalcemia and slight signs of dehydration.  Suspect less likely this is from a cerebrovascular accident.  Physical Therapy evaluation.  For history of high cholesterol, continue the patient on statin.  Fall precautions.  Appear forgetful at times suspect possible underlying MCI  possible form hospital setting   possible thoracentesis   monitor electrolyte as needed  I spoke with the spouse, Karely, at [phone]. 1/23/2021  She understands my reasoning and thought process.    Greater than 40 minutes of time was spent with the patient, plan of care, reviewing data, speaking to the family and  50% of the visit was spent counseling and/or coordinating care with multidisciplinary healthcare team  
Neurology follow up note    MARIA DEL ROSARIO [id number]      Interval History:    Patient feels ok no new complaints.    MEDICATIONS    amLODIPine   Tablet 2.5 milliGRAM(s) Oral daily  sodium chloride 0.9%. 1000 milliLiter(s) IV Continuous <Continuous>  tamsulosin 0.4 milliGRAM(s) Oral at bedtime      Allergies    No Known Allergies    Intolerances            Vital Signs Last 24 Hrs  T(C): 36.8 (24 Jan 2021 05:15), Max: 37.1 (23 Jan 2021 09:30)  T(F): 98.3 (24 Jan 2021 05:15), Max: 98.8 (23 Jan 2021 09:30)  HR: 83 (24 Jan 2021 05:15) (80 - 84)  BP: 190/78 (24 Jan 2021 05:15) (163/67 - 190/78)  BP(mean): --  RR: 16 (24 Jan 2021 05:15) (16 - 18)  SpO2: 95% (24 Jan 2021 05:15) (95% - 97%)          REVIEW OF SYSTEMS:  Constitutional:  The patient denies fever, chills, or night sweats.  Head:  No headache.  Eyes:  No double vision or blurry vision.  Ears:  No ringing in the ears.  Neck:  No neck pain.  Respiratory:  No shortness of breath.  Cardiovascular:  No chest pain.  Abdomen:  No nausea, vomiting, or abdominal pain.  Extremities/Neurological:  No numbness or tingling.  Musculoskeletal:  Occasional joint pain.  Genitourinary:  No burning upon urination.  General:  Positive history of lower back pain.    PHYSICAL EXAMINATION:   HEENT:  Head:  Normocephalic, atraumatic.  Eyes:  No scleral icterus.  Ears:  Hearing bilaterally intact.  NECK:  Supple.  RESPIRATORY:  Good air entry bilaterally.  CARDIOVASCULAR:  S1 and S2 heard.  ABDOMEN:  Soft and nontender.  EXTREMITIES:  No clubbing or cyanosis were noted.      NEUROLOGIC:  The patient is awake, alert, appears forgetful at times   Extraocular movement were intact.  Pupils were equal, round, and reactive bilaterally 3 mm to 2 mm.  Speech was fluent.  Smile was symmetric.  Motor:  Bilateral upper were 4+/5, bilateral lower were 4-/5.  Sensory:  Bilateral upper and lower were intact to light touch.  No drift.  Finger-to-nose within normal limits.               LABS:  CBC Full  -  ( 24 Jan 2021 06:53 )  WBC Count : 13.48 K/uL  RBC Count : 3.66 M/uL  Hemoglobin : 10.9 g/dL  Hematocrit : 33.0 %  Platelet Count - Automated : 27 K/uL  Mean Cell Volume : 90.2 fl  Mean Cell Hemoglobin : 29.8 pg  Mean Cell Hemoglobin Concentration : 33.0 gm/dL  Auto Neutrophil # : x  Auto Lymphocyte # : x  Auto Monocyte # : x  Auto Eosinophil # : x  Auto Basophil # : x  Auto Neutrophil % : x  Auto Lymphocyte % : x  Auto Monocyte % : x  Auto Eosinophil % : x  Auto Basophil % : x      01-24    136  |  104  |  27<H>  ----------------------------<  98  3.4<L>   |  25  |  1.23    Ca    9.9      24 Jan 2021 06:53  Mg     1.8     01-24      Hemoglobin A1C:       Vitamin B12         RADIOLOGY        ANALYSIS AND PLAN:  A 89-year-old with an episode of falls.  For episode of falls and ataxia, suspect this is multifactorial secondary to age-related changes, possibly underlying spinal disc disease and possibly underlying peripheral neuropathy.  The patient has significantly impaired proprioception of bilateral lower extremities.  Also from chemistries, the patient does have hypercalcemia and slight signs of dehydration.  Suspect less likely this is from a cerebrovascular accident.  Physical Therapy evaluation.  For history of high cholesterol, continue the patient on statin.  Fall precautions.  Appear forgetful at times suspect possible underlying MCI  possible form hospital setting   possible thoracentesis   monitor electrolyte as needed  I spoke with the spouse, Karely, at [phone]. 1/23/2021  She understands my reasoning and thought process.    Greater than 40 minutes of time was spent with the patient, plan of care, reviewing data, speaking to the family and  50% of the visit was spent counseling and/or coordinating care with multidisciplinary healthcare team    
Neurology follow up note    MARIA DEL ROSARIO [id number]      Interval History:    Patient feels ok no new complaints.    MEDICATIONS    calcitonin Injectable 250 International Unit(s) IntraMuscular every 12 hours  sodium chloride 0.9%. 1000 milliLiter(s) IV Continuous <Continuous>  tamsulosin 0.4 milliGRAM(s) Oral at bedtime      Allergies    No Known Allergies    Intolerances            Vital Signs Last 24 Hrs  T(C): 36.8 (23 Jan 2021 06:00), Max: 36.8 (22 Jan 2021 19:20)  T(F): 98.2 (23 Jan 2021 06:00), Max: 98.2 (22 Jan 2021 19:20)  HR: 85 (23 Jan 2021 06:00) (74 - 85)  BP: 184/78 (23 Jan 2021 06:00) (109/56 - 184/78)  BP(mean): --  RR: 16 (23 Jan 2021 06:00) (16 - 17)  SpO2: 96% (23 Jan 2021 06:00) (96% - 97%)        REVIEW OF SYSTEMS:  Constitutional:  The patient denies fever, chills, or night sweats.  Head:  No headache.  Eyes:  No double vision or blurry vision.  Ears:  No ringing in the ears.  Neck:  No neck pain.  Respiratory:  No shortness of breath.  Cardiovascular:  No chest pain.  Abdomen:  No nausea, vomiting, or abdominal pain.  Extremities/Neurological:  No numbness or tingling.  Musculoskeletal:  Occasional joint pain.  Genitourinary:  No burning upon urination.  General:  Positive history of lower back pain.    PHYSICAL EXAMINATION:   HEENT:  Head:  Normocephalic, atraumatic.  Eyes:  No scleral icterus.  Ears:  Hearing bilaterally intact.  NECK:  Supple.  RESPIRATORY:  Good air entry bilaterally.  CARDIOVASCULAR:  S1 and S2 heard.  ABDOMEN:  Soft and nontender.  EXTREMITIES:  No clubbing or cyanosis were noted.      NEUROLOGIC:  The patient is awake, alert, appears forgetful at times   Extraocular movement were intact.  Pupils were equal, round, and reactive bilaterally 3 mm to 2 mm.  Speech was fluent.  Smile was symmetric.  Motor:  Bilateral upper were 4+/5, bilateral lower were 4-/5.  Sensory:  Bilateral upper and lower were intact to light touch.  No drift.  Finger-to-nose within normal limits.            LABS:  CBC Full  -  ( 22 Jan 2021 07:52 )  WBC Count : 12.39 K/uL  RBC Count : 4.05 M/uL  Hemoglobin : 12.3 g/dL  Hematocrit : 36.4 %  Platelet Count - Automated : 33 K/uL  Mean Cell Volume : 89.9 fl  Mean Cell Hemoglobin : 30.4 pg  Mean Cell Hemoglobin Concentration : 33.8 gm/dL  Auto Neutrophil # : 7.91 K/uL  Auto Lymphocyte # : 2.82 K/uL  Auto Monocyte # : 1.09 K/uL  Auto Eosinophil # : 0.43 K/uL  Auto Basophil # : 0.07 K/uL  Auto Neutrophil % : 63.7 %  Auto Lymphocyte % : 22.8 %  Auto Monocyte % : 8.8 %  Auto Eosinophil % : 3.5 %  Auto Basophil % : 0.6 %      01-22    141  |  106  |  31<H>  ----------------------------<  88  3.6   |  29  |  1.58<H>    Ca    12.0<H>      22 Jan 2021 07:52  Mg     2.0     01-22    TPro  x   /  Alb  3.2<L>  /  TBili  x   /  DBili  x   /  AST  x   /  ALT  x   /  AlkPhos  x   01-21    Hemoglobin A1C:     LIVER FUNCTIONS - ( 21 Jan 2021 13:56 )  Alb: 3.2 g/dL / Pro: x     / ALK PHOS: x     / ALT: x     / AST: x     / GGT: x           Vitamin B12   PT/INR - ( 22 Jan 2021 07:52 )   PT: 13.3 sec;   INR: 1.10 ratio               RADIOLOGY      ANALYSIS AND PLAN:  A 89-year-old with an episode of falls.  For episode of falls and ataxia, suspect this is multifactorial secondary to age-related changes, possibly underlying spinal disc disease and possibly underlying peripheral neuropathy.  The patient has significantly impaired proprioception of bilateral lower extremities.  Also from chemistries, the patient does have hypercalcemia and slight signs of dehydration.  Suspect less likely this is from a cerebrovascular accident.  Physical Therapy evaluation.  For history of high cholesterol, continue the patient on statin.  Fall precautions.  Appear forgetful at times suspect possible underlying MCI  possible form hospital setting   possible thoracentesis   monitor electrolyte as needed  I spoke with the spouse, Karely, at [phone]. 1/23/2021  She understands my reasoning and thought process.    Greater than 45 minutes of time was spent with the patient, plan of care, reviewing data, speaking to the family and  50% of the visit was spent counseling and/or coordinating care with multidisciplinary healthcare team  
Neurology follow up note    MARIA DEL ROSARIO [id number]      Interval History:    Patient feels ok no new complaints.    MEDICATIONS    sodium chloride 0.9%. 1000 milliLiter(s) IV Continuous <Continuous>  tamsulosin 0.4 milliGRAM(s) Oral at bedtime      Allergies    No Known Allergies    Intolerances            Vital Signs Last 24 Hrs  T(C): 36.4 (22 Jan 2021 10:59), Max: 36.9 (22 Jan 2021 05:19)  T(F): 97.5 (22 Jan 2021 10:59), Max: 98.5 (22 Jan 2021 05:19)  HR: 74 (22 Jan 2021 10:59) (73 - 78)  BP: 122/53 (22 Jan 2021 10:59) (122/53 - 179/67)  BP(mean): --  RR: 17 (22 Jan 2021 10:59) (16 - 18)  SpO2: 97% (22 Jan 2021 10:59) (95% - 98%)      REVIEW OF SYSTEMS:  Constitutional:  The patient denies fever, chills, or night sweats.  Head:  No headache.  Eyes:  No double vision or blurry vision.  Ears:  No ringing in the ears.  Neck:  No neck pain.  Respiratory:  No shortness of breath.  Cardiovascular:  No chest pain.  Abdomen:  No nausea, vomiting, or abdominal pain.  Extremities/Neurological:  No numbness or tingling.  Musculoskeletal:  Occasional joint pain.  Genitourinary:  No burning upon urination.  General:  Positive history of lower back pain.    PHYSICAL EXAMINATION:   HEENT:  Head:  Normocephalic, atraumatic.  Eyes:  No scleral icterus.  Ears:  Hearing bilaterally intact.  NECK:  Supple.  RESPIRATORY:  Good air entry bilaterally.  CARDIOVASCULAR:  S1 and S2 heard.  ABDOMEN:  Soft and nontender.  EXTREMITIES:  No clubbing or cyanosis were noted.      NEUROLOGIC:  The patient is awake, alert, appears forgetful at times   Extraocular movement were intact.  Pupils were equal, round, and reactive bilaterally 3 mm to 2 mm.  Speech was fluent.  Smile was symmetric.  Motor:  Bilateral upper were 4+/5, bilateral lower were 4-/5.  Sensory:  Bilateral upper and lower were intact to light touch.  No drift.  Finger-to-nose within normal limits.            LABS:  CBC Full  -  ( 22 Jan 2021 07:52 )  WBC Count : 12.39 K/uL  RBC Count : 4.05 M/uL  Hemoglobin : 12.3 g/dL  Hematocrit : 36.4 %  Platelet Count - Automated : 33 K/uL  Mean Cell Volume : 89.9 fl  Mean Cell Hemoglobin : 30.4 pg  Mean Cell Hemoglobin Concentration : 33.8 gm/dL  Auto Neutrophil # : 7.91 K/uL  Auto Lymphocyte # : 2.82 K/uL  Auto Monocyte # : 1.09 K/uL  Auto Eosinophil # : 0.43 K/uL  Auto Basophil # : 0.07 K/uL  Auto Neutrophil % : 63.7 %  Auto Lymphocyte % : 22.8 %  Auto Monocyte % : 8.8 %  Auto Eosinophil % : 3.5 %  Auto Basophil % : 0.6 %      01-22    141  |  106  |  31<H>  ----------------------------<  88  3.6   |  29  |  1.58<H>    Ca    12.0<H>      22 Jan 2021 07:52  Phos  3.1     01-21  Mg     2.0     01-22    TPro  x   /  Alb  3.2<L>  /  TBili  x   /  DBili  x   /  AST  x   /  ALT  x   /  AlkPhos  x   01-21    Hemoglobin A1C:     LIVER FUNCTIONS - ( 21 Jan 2021 13:56 )  Alb: 3.2 g/dL / Pro: x     / ALK PHOS: x     / ALT: x     / AST: x     / GGT: x           Vitamin B12 Vitamin B12, Serum: 677 pg/mL (01-21 @ 19:00)    PT/INR - ( 22 Jan 2021 07:52 )   PT: 13.3 sec;   INR: 1.10 ratio               RADIOLOGY    ANALYSIS AND PLAN:  A 89-year-old with an episode of falls.  For episode of falls and ataxia, suspect this is multifactorial secondary to age-related changes, possibly underlying spinal disc disease and possibly underlying peripheral neuropathy.  The patient has significantly impaired proprioception of bilateral lower extremities.  Also from chemistries, the patient does have hypercalcemia and slight signs of dehydration.  Suspect less likely this is from a cerebrovascular accident.  Physical Therapy evaluation.  For history of high cholesterol, continue the patient on statin.  Fall precautions.  Appear forgetful at times suspect possible underlying MCI   possible thoracentesis   I spoke with the spouse, Karely, at [phone]. called today went to voicemail.  She understands my reasoning and thought process.    Greater than 45 minutes of time was spent with the patient, plan of care, reviewing data, speaking to the family and  50% of the visit was spent counseling and/or coordinating care with multidisciplinary healthcare team  
Patient is a 89y old  Male who presents with a chief complaint of multiple falls (21 Jan 2021 09:13)  Patient seen in follow up for hypercalcemia, + sarcoidosis       PAST MEDICAL HISTORY:  GI cancer  Sarcidosis      MEDICATIONS  (STANDING):  amLODIPine   Tablet 2.5 milliGRAM(s) Oral daily  tamsulosin 0.4 milliGRAM(s) Oral at bedtime    MEDICATIONS  (PRN):    T(C): 36.7 (01-25-21 @ 05:35), Max: 37.1 (01-25-21 @ 05:14)  HR: 79 (01-25-21 @ 05:35) (75 - 95)  BP: 178/74 (01-25-21 @ 05:35) (110/53 - 190/78)  RR: 17 (01-25-21 @ 05:35)  SpO2: 97% (01-25-21 @ 05:35)  Wt(kg): --  I&O's Detail    24 Jan 2021 07:01  -  25 Jan 2021 07:00  --------------------------------------------------------  IN:    Oral Fluid: 340 mL  Total IN: 340 mL    OUT:  Total OUT: 0 mL    Total NET: 340 mL              PHYSICAL EXAM:  General: No distress  Respiratory: b/l air entry  Cardiovascular: S1 S2  Gastrointestinal: soft  Extremities:  no edema                               LABORATORY:                        10.9   13.48 )-----------( 27       ( 24 Jan 2021 06:53 )             33.0     01-24    136  |  104  |  27<H>  ----------------------------<  98  3.4<L>   |  25  |  1.23    Ca    9.9      24 Jan 2021 06:53  Mg     1.8     01-24      Sodium, Serum: 136 mmol/L (01-24 @ 06:53)    Potassium, Serum: 3.4 mmol/L (01-24 @ 06:53)    Hemoglobin: 10.9 g/dL (01-24 @ 06:53)  Hemoglobin: 10.9 g/dL (01-23 @ 09:30)    Creatinine, Serum 1.23 (01-24 @ 06:53)  Creatinine, Serum 1.45 (01-23 @ 09:30)            
Patient is a 89y old  Male who presents with a chief complaint of multiple falls (2021 09:13)  Patient seen in follow up for hypercalcemia.        PAST MEDICAL HISTORY:  GI cancer  Sarcidosis      MEDICATIONS  (STANDING):  amLODIPine   Tablet 5 milliGRAM(s) Oral daily  sodium chloride 0.9%. 1000 milliLiter(s) (100 mL/Hr) IV Continuous <Continuous>  tamsulosin 0.4 milliGRAM(s) Oral at bedtime    MEDICATIONS  (PRN):    T(C): 37.1 (21 @ 10:26), Max: 37.1 (21 @ 10:26)  HR: 75 (21 @ 10:26) (58 - 78)  BP: 134/63 (21 @ 10:26) (134/63 - 178/70)  RR: 18 (21 @ 10:26) (16 - 18)  SpO2: 93% (21 @ 10:26) (93% - 98%)  Wt(kg): --  I&O's Detail    2021 07:01  -  2021 07:00  --------------------------------------------------------  IN:    sodium chloride 0.9%: 800 mL  Total IN: 800 mL    OUT:  Total OUT: 0 mL    Total NET: 800 mL          PHYSICAL EXAM:  General: No distress  Respiratory: b/l air entry  Cardiovascular: S1 S2  Gastrointestinal: soft  Extremities:  no edema                              11.6   14.07 )-----------( 32       ( 2021 08:03 )             35.6         142  |  108  |  34<H>  ----------------------------<  80  3.8   |  25  |  1.51<H>    Ca    12.2<H>      2021 08:03  Phos  3.1         TPro  6.8  /  Alb  2.8<L>  /  TBili  0.5  /  DBili  x   /  AST  27  /  ALT  16  /  AlkPhos  86      CARDIAC MARKERS ( 2021 13:17 )  .013 ng/mL / x     / x     / x     / x          LIVER FUNCTIONS - ( 2021 08:03 )  Alb: 2.8 g/dL / Pro: 6.8 g/dL / ALK PHOS: 86 U/L / ALT: 16 U/L DA / AST: 27 U/L / GGT: x           Urinalysis Basic - ( 2021 14:38 )    Color: Yellow / Appearance: Clear / S.025 / pH: x  Gluc: x / Ketone: Negative  / Bili: Negative / Urobili: Negative mg/dL   Blood: x / Protein: 30 mg/dL / Nitrite: Negative   Leuk Esterase: Trace / RBC: 3-5 /HPF / WBC 3-5   Sq Epi: x / Non Sq Epi: Few / Bacteria: Few        Sodium, Serum: 142 ( @ 08:03)  Sodium, Serum: 138 ( @ 13:17)    Creatinine, Serum: 1.51 ( @ 08:03)  Creatinine, Serum: 1.78 ( @ 13:17)    Potassium, Serum: 3.8 ( @ 08:03)  Potassium, Serum: 3.8 ( @ 13:17)    Hemoglobin: 11.6 ( @ 08:03)  Hemoglobin: 11.5 ( @ 13:17)    
Date/Time Patient Seen:  		  Referring MD:   Data Reviewed	       Patient is a 89y old  Male who presents with a chief complaint of multiple falls (22 Jan 2021 14:39)      Subjective/HPI     PAST MEDICAL & SURGICAL HISTORY:  JOSS (acute kidney injury)    Hypercalcemia    Gastric lymphoma    Sarcoidosis    GI cancer    No significant past surgical history          Medication list         MEDICATIONS  (STANDING):  calcitonin Injectable 250 International Unit(s) IntraMuscular every 12 hours  sodium chloride 0.9%. 1000 milliLiter(s) (100 mL/Hr) IV Continuous <Continuous>  tamsulosin 0.4 milliGRAM(s) Oral at bedtime    MEDICATIONS  (PRN):         Vitals log        ICU Vital Signs Last 24 Hrs  T(C): 36.8 (23 Jan 2021 06:00), Max: 36.8 (22 Jan 2021 19:20)  T(F): 98.2 (23 Jan 2021 06:00), Max: 98.2 (22 Jan 2021 19:20)  HR: 85 (23 Jan 2021 06:00) (73 - 85)  BP: 184/78 (23 Jan 2021 06:00) (109/56 - 184/78)  BP(mean): --  ABP: --  ABP(mean): --  RR: 16 (23 Jan 2021 06:00) (16 - 17)  SpO2: 96% (23 Jan 2021 06:00) (95% - 97%)           Input and Output:  I&O's Detail    22 Jan 2021 07:01  -  23 Jan 2021 07:00  --------------------------------------------------------  IN:    sodium chloride 0.9%: 500 mL  Total IN: 500 mL    OUT:    Voided (mL): 300 mL  Total OUT: 300 mL    Total NET: 200 mL          Lab Data                        12.3   12.39 )-----------( 33       ( 22 Jan 2021 07:52 )             36.4     01-22    141  |  106  |  31<H>  ----------------------------<  88  3.6   |  29  |  1.58<H>    Ca    12.0<H>      22 Jan 2021 07:52  Phos  3.1     01-21  Mg     2.0     01-22    TPro  x   /  Alb  3.2<L>  /  TBili  x   /  DBili  x   /  AST  x   /  ALT  x   /  AlkPhos  x   01-21            Review of Systems	      Objective     Physical Examination    heart s1s2  lung dec BS      Pertinent Lab findings & Imaging      Jose:  NO   Adequate UO     I&O's Detail    22 Jan 2021 07:01  -  23 Jan 2021 07:00  --------------------------------------------------------  IN:    sodium chloride 0.9%: 500 mL  Total IN: 500 mL    OUT:    Voided (mL): 300 mL  Total OUT: 300 mL    Total NET: 200 mL               Discussed with:     Cultures:	        Radiology                            
Date/Time Patient Seen:  		  Referring MD:   Data Reviewed	       Patient is a 89y old  Male who presents with a chief complaint of multiple falls (23 Jan 2021 10:49)      Subjective/HPI     PAST MEDICAL & SURGICAL HISTORY:  JOSS (acute kidney injury)    Hypercalcemia    Gastric lymphoma    Sarcoidosis    GI cancer    No significant past surgical history          Medication list         MEDICATIONS  (STANDING):  amLODIPine   Tablet 2.5 milliGRAM(s) Oral daily  sodium chloride 0.9%. 1000 milliLiter(s) (100 mL/Hr) IV Continuous <Continuous>  tamsulosin 0.4 milliGRAM(s) Oral at bedtime    MEDICATIONS  (PRN):         Vitals log        ICU Vital Signs Last 24 Hrs  T(C): 36.8 (24 Jan 2021 05:15), Max: 37.1 (23 Jan 2021 09:30)  T(F): 98.3 (24 Jan 2021 05:15), Max: 98.8 (23 Jan 2021 09:30)  HR: 83 (24 Jan 2021 05:15) (80 - 84)  BP: 190/78 (24 Jan 2021 05:15) (163/67 - 190/78)  BP(mean): --  ABP: --  ABP(mean): --  RR: 16 (24 Jan 2021 05:15) (16 - 18)  SpO2: 95% (24 Jan 2021 05:15) (95% - 97%)           Input and Output:  I&O's Detail    23 Jan 2021 07:01  -  24 Jan 2021 07:00  --------------------------------------------------------  IN:    Oral Fluid: 720 mL    sodium chloride 0.9%: 1100 mL  Total IN: 1820 mL    OUT:    Voided (mL): 400 mL  Total OUT: 400 mL    Total NET: 1420 mL          Lab Data                        10.9   13.48 )-----------( 27       ( 24 Jan 2021 06:53 )             33.0     01-24    136  |  104  |  27<H>  ----------------------------<  98  3.4<L>   |  25  |  1.23    Ca    9.9      24 Jan 2021 06:53  Mg     1.8     01-24              Review of Systems	      Objective     Physical Examination    heart s1s2  lung dec BS  abd soft      Pertinent Lab findings & Imaging      Jose:  NO   Adequate UO     I&O's Detail    23 Jan 2021 07:01  -  24 Jan 2021 07:00  --------------------------------------------------------  IN:    Oral Fluid: 720 mL    sodium chloride 0.9%: 1100 mL  Total IN: 1820 mL    OUT:    Voided (mL): 400 mL  Total OUT: 400 mL    Total NET: 1420 mL               Discussed with:     Cultures:	        Radiology                            
Date/Time Patient Seen:  		  Referring MD:   Data Reviewed	       Patient is a 89y old  Male who presents with a chief complaint of multiple falls (24 Jan 2021 10:34)      Subjective/HPI     PAST MEDICAL & SURGICAL HISTORY:  JOSS (acute kidney injury)    Hypercalcemia    Gastric lymphoma    Sarcoidosis    GI cancer    No significant past surgical history          Medication list         MEDICATIONS  (STANDING):  amLODIPine   Tablet 2.5 milliGRAM(s) Oral daily  tamsulosin 0.4 milliGRAM(s) Oral at bedtime    MEDICATIONS  (PRN):         Vitals log        ICU Vital Signs Last 24 Hrs  T(C): 36.7 (25 Jan 2021 05:35), Max: 37.1 (25 Jan 2021 05:14)  T(F): 98 (25 Jan 2021 05:35), Max: 98.7 (25 Jan 2021 05:14)  HR: 79 (25 Jan 2021 05:35) (75 - 95)  BP: 178/74 (25 Jan 2021 05:35) (110/53 - 178/74)  BP(mean): --  ABP: --  ABP(mean): --  RR: 17 (25 Jan 2021 05:35) (16 - 18)  SpO2: 97% (25 Jan 2021 05:35) (95% - 97%)           Input and Output:  I&O's Detail    24 Jan 2021 07:01  -  25 Jan 2021 07:00  --------------------------------------------------------  IN:    Oral Fluid: 340 mL  Total IN: 340 mL    OUT:  Total OUT: 0 mL    Total NET: 340 mL          Lab Data                        10.9   13.48 )-----------( 27       ( 24 Jan 2021 06:53 )             33.0     01-24    136  |  104  |  27<H>  ----------------------------<  98  3.4<L>   |  25  |  1.23    Ca    9.9      24 Jan 2021 06:53  Mg     1.8     01-24              Review of Systems	      Objective     Physical Examination    heart s1s2  lung dec BS      Pertinent Lab findings & Imaging      Garcia:  NO   Adequate UO     I&O's Detail    24 Jan 2021 07:01  -  25 Jan 2021 07:00  --------------------------------------------------------  IN:    Oral Fluid: 340 mL  Total IN: 340 mL    OUT:  Total OUT: 0 mL    Total NET: 340 mL               Discussed with:     Cultures:	        Radiology                            
Patient is a 89y old  Male who presents with a chief complaint of multiple falls (2021 08:58)      INTERVAL HPI/OVERNIGHT EVENTS:  Patient seen and examined. NAD. No complaints. Confused.    Vital Signs Last 24 Hrs  T(C): 36.4 (2021 10:59), Max: 36.9 (2021 05:19)  T(F): 97.5 (2021 10:59), Max: 98.5 (2021 05:19)  HR: 74 (2021 10:59) (73 - 82)  BP: 122/53 (2021 10:59) (95/52 - 179/67)  BP(mean): --  RR: 17 (2021 10:59) (16 - 18)  SpO2: 97% (2021 10:59) (94% - 98%)        141  |  106  |  31<H>  ----------------------------<  88  3.6   |  29  |  1.58<H>    Ca    12.0<H>      2021 07:52  Phos  3.1       Mg     2.0         TPro  x   /  Alb  3.2<L>  /  TBili  x   /  DBili  x   /  AST  x   /  ALT  x   /  AlkPhos  x                             12.3   12.39 )-----------( 33       ( 2021 07:52 )             36.4     PT/INR - ( 2021 07:52 )   PT: 13.3 sec;   INR: 1.10 ratio         PTT - ( 2021 13:17 )  PTT:28.5 sec  CAPILLARY BLOOD GLUCOSE        Urinalysis Basic - ( 2021 14:38 )    Color: Yellow / Appearance: Clear / S.025 / pH: x  Gluc: x / Ketone: Negative  / Bili: Negative / Urobili: Negative mg/dL   Blood: x / Protein: 30 mg/dL / Nitrite: Negative   Leuk Esterase: Trace / RBC: 3-5 /HPF / WBC 3-5   Sq Epi: x / Non Sq Epi: Few / Bacteria: Few              sodium chloride 0.9%. 1000 milliLiter(s) IV Continuous <Continuous>  tamsulosin 0.4 milliGRAM(s) Oral at bedtime              REVIEW OF SYSTEMS:  CONSTITUTIONAL: No fever, no weight loss, or no fatigue  NECK: No pain, no stiffness  RESPIRATORY: No cough, no wheezing, no chills, no hemoptysis, No shortness of breath  CARDIOVASCULAR: No chest pain, no palpitations, no dizziness, no leg swelling  GASTROINTESTINAL: No abdominal pain. No nausea, no vomiting, no hematemesis; No diarrhea, no constipation. No melena, no hematochezia.  GENITOURINARY: No dysuria, no frequency, no hematuria, no incontinence  NEUROLOGICAL: No headaches, no loss of strength, no numbness, no tremors  SKIN: No itching, no burning  MUSCULOSKELETAL: No joint pain, no swelling; No muscle, no back, no extremity pain  PSYCHIATRIC: No depression, no mood swings,   HEME/LYMPH: No easy bruising, no bleeding gums  ALLERY AND IMMUNOLOGIC: No hives       Consultant(s) Notes Reviewed:  [X] YES  [ ] NO    PHYSICAL EXAM:  GENERAL: NAD  HEAD:  Atraumatic, Normocephalic  EYES: EOMI, PERRLA, conjunctiva and sclera clear  ENMT: No tonsillar erythema, exudates, or enlargement; Moist mucous membranes  NECK: Supple, No JVD  NERVOUS SYSTEM:  Awake & alert  CHEST/LUNG: Clear to auscultation bilaterally; No rales, rhonchi, wheezing,  HEART: Regular rate and rhythm  ABDOMEN: Soft, Nontender, Nondistended; Bowel sounds present  EXTREMITIES:  No clubbing, cyanosis, or edema  LYMPH: No lymphadenopathy noted  SKIN: No rashes      Advanced care planning discussed with patient/family [X] YES   [ ] NO    Advanced care planning discussed with patient/family. Patient's health status was discussed. All appropriate changes have been made regarding patient's end-of-life care. Advanced care planning forms reviewed/discussed/completed.  20 minutes spent.   
Date/Time Patient Seen:  		  Referring MD:   Data Reviewed	       Patient is a 89y old  Male who presents with a chief complaint of multiple falls (21 Jan 2021 18:32)      Subjective/HPI     PAST MEDICAL & SURGICAL HISTORY:  JOSS (acute kidney injury)    Hypercalcemia    Gastric lymphoma    Sarcoidosis    GI cancer    No significant past surgical history          Medication list         MEDICATIONS  (STANDING):  sodium chloride 0.9%. 1000 milliLiter(s) (100 mL/Hr) IV Continuous <Continuous>  tamsulosin 0.4 milliGRAM(s) Oral at bedtime    MEDICATIONS  (PRN):         Vitals log        ICU Vital Signs Last 24 Hrs  T(C): 36.6 (22 Jan 2021 08:18), Max: 37.1 (21 Jan 2021 10:26)  T(F): 97.8 (22 Jan 2021 08:18), Max: 98.8 (21 Jan 2021 10:26)  HR: 73 (22 Jan 2021 08:18) (73 - 82)  BP: 169/63 (22 Jan 2021 08:18) (95/52 - 179/67)  BP(mean): --  ABP: --  ABP(mean): --  RR: 16 (22 Jan 2021 08:18) (16 - 18)  SpO2: 95% (22 Jan 2021 08:18) (93% - 98%)           Input and Output:  I&O's Detail    21 Jan 2021 07:01  -  22 Jan 2021 07:00  --------------------------------------------------------  IN:    IV PiggyBack: 250 mL    sodium chloride 0.9%: 1000 mL  Total IN: 1250 mL    OUT:  Total OUT: 0 mL    Total NET: 1250 mL          Lab Data                        12.3   12.39 )-----------( 33       ( 22 Jan 2021 07:52 )             36.4     01-22    141  |  106  |  31<H>  ----------------------------<  88  3.6   |  29  |  1.58<H>    Ca    12.0<H>      22 Jan 2021 07:52  Phos  3.1     01-21  Mg     2.0     01-22    TPro  x   /  Alb  3.2<L>  /  TBili  x   /  DBili  x   /  AST  x   /  ALT  x   /  AlkPhos  x   01-21      CARDIAC MARKERS ( 20 Jan 2021 13:17 )  .013 ng/mL / x     / x     / x     / x            Review of Systems	      Objective     Physical Examination    heart s1s2  lung dc BS  abd soft      Pertinent Lab findings & Imaging      Jose:  NO   Adequate UO     I&O's Detail    21 Jan 2021 07:01  -  22 Jan 2021 07:00  --------------------------------------------------------  IN:    IV PiggyBack: 250 mL    sodium chloride 0.9%: 1000 mL  Total IN: 1250 mL    OUT:  Total OUT: 0 mL    Total NET: 1250 mL               Discussed with:     Cultures:	        Radiology                            
Patient is a 89y old  Male who presents with a chief complaint of multiple falls (25 Jan 2021 08:43)      INTERVAL HPI/OVERNIGHT EVENTS: Patient seen and examined. NAD. No complaints.    Vital Signs Last 24 Hrs  T(C): 36.7 (25 Jan 2021 05:35), Max: 37.1 (25 Jan 2021 05:14)  T(F): 98 (25 Jan 2021 05:35), Max: 98.7 (25 Jan 2021 05:14)  HR: 79 (25 Jan 2021 05:35) (75 - 86)  BP: 178/74 (25 Jan 2021 05:35) (110/53 - 178/74)  BP(mean): --  RR: 17 (25 Jan 2021 05:35) (16 - 18)  SpO2: 97% (25 Jan 2021 05:35) (95% - 97%)    01-24    136  |  104  |  27<H>  ----------------------------<  98  3.4<L>   |  25  |  1.23    Ca    9.9      24 Jan 2021 06:53  Mg     1.8     01-24                            10.9   13.48 )-----------( 27       ( 24 Jan 2021 06:53 )             33.0       CAPILLARY BLOOD GLUCOSE                  amLODIPine   Tablet 2.5 milliGRAM(s) Oral daily  tamsulosin 0.4 milliGRAM(s) Oral at bedtime              REVIEW OF SYSTEMS:  CONSTITUTIONAL: No fever, no weight loss, or no fatigue  NECK: No pain, no stiffness  RESPIRATORY: No cough, no wheezing, no chills, no hemoptysis, No shortness of breath  CARDIOVASCULAR: No chest pain, no palpitations, no dizziness, no leg swelling  GASTROINTESTINAL: No abdominal pain. No nausea, no vomiting, no hematemesis; No diarrhea, no constipation. No melena, no hematochezia.  GENITOURINARY: No dysuria, no frequency, no hematuria, no incontinence  NEUROLOGICAL: No headaches, no loss of strength, no numbness, no tremors  SKIN: No itching, no burning  MUSCULOSKELETAL: No joint pain, no swelling; No muscle, no back, no extremity pain  PSYCHIATRIC: No depression, no mood swings,   HEME/LYMPH: No easy bruising, no bleeding gums  ALLERY AND IMMUNOLOGIC: No hives       Consultant(s) Notes Reviewed:  [X] YES  [ ] NO    PHYSICAL EXAM:  GENERAL: NAD  HEAD:  Atraumatic, Normocephalic  EYES: EOMI, PERRLA, conjunctiva and sclera clear  ENMT: No tonsillar erythema, exudates, or enlargement; Moist mucous membranes  NECK: Supple, No JVD  NERVOUS SYSTEM:  Awake & alert  CHEST/LUNG: Clear to auscultation bilaterally; No rales, rhonchi, wheezing,  HEART: Regular rate and rhythm  ABDOMEN: Soft, Nontender, Nondistended; Bowel sounds present  EXTREMITIES:  No clubbing, cyanosis, or edema  LYMPH: No lymphadenopathy noted  SKIN: No rashes      Advanced care planning discussed with patient/family [X] YES   [ ] NO    Advanced care planning discussed with patient/family. Patient's health status was discussed. All appropriate changes have been made regarding patient's end-of-life care. Advanced care planning forms reviewed/discussed/completed.  20 minutes spent.   
Patient is a 89y old  Male who presents with a chief complaint of multiple falls (2021 12:47)      INTERVAL HPI/OVERNIGHT EVENTS: Patient seen and examined. NAD. No complaints. Confused.    Vital Signs Last 24 Hrs  T(C): 37.1 (2021 10:26), Max: 37.1 (2021 10:26)  T(F): 98.8 (2021 10:26), Max: 98.8 (2021 10:26)  HR: 75 (2021 10:26) (58 - 78)  BP: 134/63 (2021 10:26) (134/63 - 178/70)  BP(mean): --  RR: 18 (2021 10:26) (16 - 18)  SpO2: 93% (2021 10:26) (93% - 98%)        142  |  108  |  34<H>  ----------------------------<  80  3.8   |  25  |  1.51<H>    Ca    12.2<H>      2021 08:03  Phos  3.1         TPro  6.8  /  Alb  2.8<L>  /  TBili  0.5  /  DBili  x   /  AST  27  /  ALT  16  /  AlkPhos  86                            11.6   14.07 )-----------( 32       ( 2021 08:03 )             35.6     PT/INR - ( 2021 13:17 )   PT: 12.7 sec;   INR: 1.05 ratio         PTT - ( 2021 13:17 )  PTT:28.5 sec  CAPILLARY BLOOD GLUCOSE      POCT Blood Glucose.: 87 mg/dL (2021 17:43)    Urinalysis Basic - ( 2021 14:38 )    Color: Yellow / Appearance: Clear / S.025 / pH: x  Gluc: x / Ketone: Negative  / Bili: Negative / Urobili: Negative mg/dL   Blood: x / Protein: 30 mg/dL / Nitrite: Negative   Leuk Esterase: Trace / RBC: 3-5 /HPF / WBC 3-5   Sq Epi: x / Non Sq Epi: Few / Bacteria: Few              amLODIPine   Tablet 5 milliGRAM(s) Oral daily  furosemide    Tablet 20 milliGRAM(s) Oral once  sodium chloride 0.9%. 1000 milliLiter(s) IV Continuous <Continuous>  tamsulosin 0.4 milliGRAM(s) Oral at bedtime              REVIEW OF SYSTEMS:  CONSTITUTIONAL: No fever, no weight loss, or no fatigue  NECK: No pain, no stiffness  RESPIRATORY: No cough, no wheezing, no chills, no hemoptysis, No shortness of breath  CARDIOVASCULAR: No chest pain, no palpitations, no dizziness, no leg swelling  GASTROINTESTINAL: No abdominal pain. No nausea, no vomiting, no hematemesis; No diarrhea, no constipation. No melena, no hematochezia.  GENITOURINARY: No dysuria, no frequency, no hematuria, no incontinence  NEUROLOGICAL: No headaches, no loss of strength, no numbness, no tremors  SKIN: No itching, no burning  MUSCULOSKELETAL: No joint pain, no swelling; No muscle, no back, no extremity pain  PSYCHIATRIC: No depression, no mood swings,   HEME/LYMPH: No easy bruising, no bleeding gums  ALLERY AND IMMUNOLOGIC: No hives       Consultant(s) Notes Reviewed:  [X] YES  [ ] NO    PHYSICAL EXAM:  GENERAL: NAD  HEAD:  Atraumatic, Normocephalic  EYES: EOMI, PERRLA, conjunctiva and sclera clear  ENMT: No tonsillar erythema, exudates, or enlargement; Moist mucous membranes  NECK: Supple, No JVD  NERVOUS SYSTEM:  Awake & alert  CHEST/LUNG: Clear to auscultation bilaterally; No rales, rhonchi, wheezing,  HEART: Regular rate and rhythm  ABDOMEN: Soft, Nontender, Nondistended; Bowel sounds present  EXTREMITIES:  No clubbing, cyanosis, or edema  LYMPH: No lymphadenopathy noted  SKIN: No rashes      Advanced care planning discussed with patient/family [X] YES   [ ] NO    Advanced care planning discussed with patient/family. Patient's health status was discussed. All appropriate changes have been made regarding patient's end-of-life care. Advanced care planning forms reviewed/discussed/completed.  20 minutes spent.   
Patient is a 89y old  Male who presents with a chief complaint of multiple falls (22 Jan 2021 08:58)      INTERVAL HPI/OVERNIGHT EVENTS:  Patient seen and examined. NAD. No complaints. Confused.    Vital Signs Last 24 Hrs  T(C): 36.9 (24 Jan 2021 09:36), Max: 36.9 (23 Jan 2021 13:30)  T(F): 98.4 (24 Jan 2021 09:36), Max: 98.5 (23 Jan 2021 18:11)  HR: 95 (24 Jan 2021 09:36) (80 - 95)  BP: 115/64 (24 Jan 2021 09:36) (115/64 - 190/78)  BP(mean): --  RR: 16 (24 Jan 2021 09:36) (16 - 17)  SpO2: 95% (24 Jan 2021 09:36) (95% - 97%)    01-24    136  |  104  |  27<H>  ----------------------------<  98  3.4<L>   |  25  |  1.23    Ca    9.9      24 Jan 2021 06:53  Mg     1.8     01-24                            10.9   13.48 )-----------( 27       ( 24 Jan 2021 06:53 )             33.0       CAPILLARY BLOOD GLUCOSE                  amLODIPine   Tablet 2.5 milliGRAM(s) Oral daily  sodium chloride 0.9%. 1000 milliLiter(s) IV Continuous <Continuous>  tamsulosin 0.4 milliGRAM(s) Oral at bedtime          REVIEW OF SYSTEMS:  CONSTITUTIONAL: No fever, no weight loss, or no fatigue  NECK: No pain, no stiffness  RESPIRATORY: No cough, no wheezing, no chills, no hemoptysis, No shortness of breath  CARDIOVASCULAR: No chest pain, no palpitations, no dizziness, no leg swelling  GASTROINTESTINAL: No abdominal pain. No nausea, no vomiting, no hematemesis; No diarrhea, no constipation. No melena, no hematochezia.  GENITOURINARY: No dysuria, no frequency, no hematuria, no incontinence  NEUROLOGICAL: No headaches, no loss of strength, no numbness, no tremors  SKIN: No itching, no burning  MUSCULOSKELETAL: No joint pain, no swelling; No muscle, no back, no extremity pain  PSYCHIATRIC: No depression, no mood swings,   HEME/LYMPH: No easy bruising, no bleeding gums  ALLERY AND IMMUNOLOGIC: No hives       Consultant(s) Notes Reviewed:  [X] YES  [ ] NO    PHYSICAL EXAM:  GENERAL: NAD  HEAD:  Atraumatic, Normocephalic  EYES: EOMI, PERRLA, conjunctiva and sclera clear  ENMT: No tonsillar erythema, exudates, or enlargement; Moist mucous membranes  NECK: Supple, No JVD  NERVOUS SYSTEM:  Awake & alert  CHEST/LUNG: Clear to auscultation bilaterally; No rales, rhonchi, wheezing,  HEART: Regular rate and rhythm  ABDOMEN: Soft, Nontender, Nondistended; Bowel sounds present  EXTREMITIES:  No clubbing, cyanosis, or edema  LYMPH: No lymphadenopathy noted  SKIN: No rashes      Advanced care planning discussed with patient/family [X] YES   [ ] NO    Advanced care planning discussed with patient/family. Patient's health status was discussed. All appropriate changes have been made regarding patient's end-of-life care. Advanced care planning forms reviewed/discussed/completed.  20 minutes spent.   
Patient is a 89y old  Male who presents with a chief complaint of multiple falls (22 Jan 2021 08:58)      INTERVAL HPI/OVERNIGHT EVENTS:  Patient seen and examined. NAD. No complaints. Confused.    Vital Signs Last 24 Hrs  T(C): 37.1 (23 Jan 2021 09:30), Max: 37.1 (23 Jan 2021 09:30)  T(F): 98.8 (23 Jan 2021 09:30), Max: 98.8 (23 Jan 2021 09:30)  HR: 84 (23 Jan 2021 09:30) (74 - 85)  BP: 184/78 (23 Jan 2021 06:00) (109/56 - 184/78)  BP(mean): --  RR: 18 (23 Jan 2021 09:30) (16 - 18)  SpO2: 96% (23 Jan 2021 09:30) (96% - 97%)    01-23    137  |  104  |  33<H>  ----------------------------<  118<H>  3.6   |  27  |  1.45<H>    Ca    10.6<H>      23 Jan 2021 09:30  Mg     1.8     01-23    TPro  x   /  Alb  3.2<L>  /  TBili  x   /  DBili  x   /  AST  x   /  ALT  x   /  AlkPhos  x   01-21                          10.9   13.53 )-----------( 29       ( 23 Jan 2021 09:30 )             32.8     PT/INR - ( 22 Jan 2021 07:52 )   PT: 13.3 sec;   INR: 1.10 ratio           CAPILLARY BLOOD GLUCOSE                  amLODIPine   Tablet 2.5 milliGRAM(s) Oral daily  calcitonin Injectable 250 International Unit(s) IntraMuscular every 12 hours  sodium chloride 0.9%. 1000 milliLiter(s) IV Continuous <Continuous>  tamsulosin 0.4 milliGRAM(s) Oral at bedtime              REVIEW OF SYSTEMS:  CONSTITUTIONAL: No fever, no weight loss, or no fatigue  NECK: No pain, no stiffness  RESPIRATORY: No cough, no wheezing, no chills, no hemoptysis, No shortness of breath  CARDIOVASCULAR: No chest pain, no palpitations, no dizziness, no leg swelling  GASTROINTESTINAL: No abdominal pain. No nausea, no vomiting, no hematemesis; No diarrhea, no constipation. No melena, no hematochezia.  GENITOURINARY: No dysuria, no frequency, no hematuria, no incontinence  NEUROLOGICAL: No headaches, no loss of strength, no numbness, no tremors  SKIN: No itching, no burning  MUSCULOSKELETAL: No joint pain, no swelling; No muscle, no back, no extremity pain  PSYCHIATRIC: No depression, no mood swings,   HEME/LYMPH: No easy bruising, no bleeding gums  ALLERY AND IMMUNOLOGIC: No hives       Consultant(s) Notes Reviewed:  [X] YES  [ ] NO    PHYSICAL EXAM:  GENERAL: NAD  HEAD:  Atraumatic, Normocephalic  EYES: EOMI, PERRLA, conjunctiva and sclera clear  ENMT: No tonsillar erythema, exudates, or enlargement; Moist mucous membranes  NECK: Supple, No JVD  NERVOUS SYSTEM:  Awake & alert  CHEST/LUNG: Clear to auscultation bilaterally; No rales, rhonchi, wheezing,  HEART: Regular rate and rhythm  ABDOMEN: Soft, Nontender, Nondistended; Bowel sounds present  EXTREMITIES:  No clubbing, cyanosis, or edema  LYMPH: No lymphadenopathy noted  SKIN: No rashes      Advanced care planning discussed with patient/family [X] YES   [ ] NO    Advanced care planning discussed with patient/family. Patient's health status was discussed. All appropriate changes have been made regarding patient's end-of-life care. Advanced care planning forms reviewed/discussed/completed.  20 minutes spent.

## 2021-01-25 NOTE — PROGRESS NOTE ADULT - ASSESSMENT
JOSS: Prerenal azotemia  Hypercalcemia, h/o Sarcoidosis  Hypertension  s/p Fall    Recheck labs in am. Improved calcium levels. Monitor BP trend. Titrate BP meds as needed. Salt restriction. Fall precautions.   Avoid nephrotoxic meds as possible. Will follow electrolytes and renal function trend. Out pt f/w with hematology for abnormal SPEP and Rheumatology for sarcoidosis.  JOSS: Prerenal azotemia  Hypercalcemia, h/o Sarcoidosis  Hypertension  s/p Fall    Improved calcium levels. Monitor BP trend. Titrate BP meds as needed. Salt restriction. Fall precautions.   Avoid nephrotoxic meds as possible. For hospice as per family wishes.

## 2021-01-25 NOTE — PROGRESS NOTE ADULT - PROBLEM SELECTOR PLAN 6
Patient has non-hodgkins lymphoma (gastric)  Family wants home hospice -- referral made  Oncology consult noted  Further work-up/management pending clinical course.
Patient has non-hodgkins lymphoma (gastric)  Family wants home hospice -- referral made  D/C home on home hospice
PT
Patient has non-hodgkins lymphoma (gastric)  Family wants home hospice -- referral made  Oncology consult noted  Further work-up/management pending clinical course.

## 2021-01-25 NOTE — PROGRESS NOTE ADULT - PROBLEM SELECTOR PROBLEM 2
JOSS (acute kidney injury)

## 2021-02-03 LAB — PTH RELATED PROT SERPL-MCNC: <2 PMOL/L — SIGNIFICANT CHANGE UP

## 2022-04-05 NOTE — PROGRESS NOTE ADULT - ASSESSMENT
Monitor: The patient's hypertension is improving with treatment.  Evaluation: Diagnostic tests ordered, see full progress note.  Assessment/Treatment:  Continue current treatment/monitoring regimen.  Continue diet and exercise program  Condition will be reassessed in 6 months      The patient is a 89 year old male with a history of gastric cancer s/p chemotherapy who presents with fall.    Plan:  - Episode more consistent with a mechanical fall rather than syncope  - ECG with LBBB; no prior for comparison  - CT head negative for acute pathology  - Cardiac enzymes negative  - On IV fluids  - Echo with mildly reduced LV systolic function  - Not a candidate for further work-up of heart failure  - Off of antihypertensives due to orthostatic hypotension. Tolerate supine hypertension.  - Neurology follow-up  - Awaiting thoracentesis